# Patient Record
Sex: FEMALE | Race: BLACK OR AFRICAN AMERICAN | Employment: OTHER | URBAN - METROPOLITAN AREA
[De-identification: names, ages, dates, MRNs, and addresses within clinical notes are randomized per-mention and may not be internally consistent; named-entity substitution may affect disease eponyms.]

---

## 2018-04-25 ENCOUNTER — HOSPITAL ENCOUNTER (OUTPATIENT)
Dept: WOMENS IMAGING | Age: 73
Discharge: HOME | End: 2018-04-25

## 2018-04-25 DIAGNOSIS — M85.80 OTHER SPECIFIED DISORDERS OF BONE DENSITY AND STRUCTURE: ICD-10-CM

## 2018-04-25 DIAGNOSIS — M81.0 AGE-RELATED OSTEOPOROSIS WITHOUT CURRENT PATHOLOGICAL FRACTURE: ICD-10-CM

## 2019-05-10 ENCOUNTER — HOSPITAL ENCOUNTER (OUTPATIENT)
Dept: PHYSICAL THERAPY | Age: 74
Setting detail: THERAPIES SERIES
Discharge: HOME | End: 2019-05-10
Payer: MEDICARE

## 2019-05-10 PROCEDURE — 97162 PT EVAL MOD COMPLEX 30 MIN: CPT

## 2019-05-10 PROCEDURE — 97110 THERAPEUTIC EXERCISES: CPT

## 2019-05-10 ASSESSMENT — PAIN DESCRIPTION - ORIENTATION: ORIENTATION: LEFT;POSTERIOR

## 2019-05-10 ASSESSMENT — PAIN SCALES - GENERAL: PAINLEVEL_OUTOF10: 3

## 2019-05-10 ASSESSMENT — PAIN DESCRIPTION - PROGRESSION: CLINICAL_PROGRESSION: GRADUALLY IMPROVING

## 2019-05-10 ASSESSMENT — PAIN DESCRIPTION - DESCRIPTORS: DESCRIPTORS: ACHING;DULL

## 2019-05-10 ASSESSMENT — PAIN DESCRIPTION - FREQUENCY: FREQUENCY: CONTINUOUS

## 2019-05-10 ASSESSMENT — PAIN - FUNCTIONAL ASSESSMENT: PAIN_FUNCTIONAL_ASSESSMENT: ACTIVITIES ARE NOT PREVENTED

## 2019-05-10 ASSESSMENT — PAIN DESCRIPTION - ONSET: ONSET: GRADUAL

## 2019-05-10 ASSESSMENT — PAIN DESCRIPTION - LOCATION: LOCATION: BUTTOCKS

## 2019-05-10 ASSESSMENT — PAIN DESCRIPTION - PAIN TYPE: TYPE: CHRONIC PAIN

## 2019-05-10 NOTE — PROGRESS NOTES
Physical Therapy  Initial Assessment  Date: 5/10/2019  Patient Name: Seth Dent  MRN: [id number]  : 1945     Treatment Diagnosis: L LE weakness, glute pain     Restrictions       Subjective   General  Chart Reviewed: Yes  Patient assessed for rehabilitation services?: Yes  Referring Practitioner: Sara Sandoval CNP   Diagnosis: L Gluteus Strain   Subjective  Subjective: Intermittent for past few years with flair up this past March after fall onto hip in the yard with improvement with lidocane patches. No previous therapy. States having falls related tripping over things. No imaging in the hip. States pain with laying/sitting on L buttock. Return in a few weeks to referring provider. Pain Screening  Patient Currently in Pain: Yes  Pain Assessment  Pain Assessment: 0-10  Pain Level: 3(Worst: 5/10 )  Patient's Stated Pain Goal: No pain  Pain Type: Chronic pain  Pain Location: Buttocks(Left )  Pain Orientation: Left;Posterior  Pain Descriptors: Aching;Dull  Pain Frequency: Continuous  Pain Onset: Gradual  Clinical Progression: Gradually improving  Functional Pain Assessment: Activities are not prevented(Completes all activities at slower pace)  Non-Pharmaceutical Pain Intervention(s): Heat applied(patches)  Vital Signs  Patient Currently in Pain: Yes    Vision/Hearing  Vision  Vision: Impaired(donning glasses )  Hearing  Hearing: Within functional limits    Orientation  Orientation  Overall Orientation Status: Within Normal Limits    Social/Functional History  Social/Functional History  Type of Home: House  Home Layout: Two level  Home Access: Stairs to enter with rails  Entrance Stairs - Rails: Both  Bathroom Shower/Tub: Tub/Shower unit; Walk-in shower  ADL Assistance: Independent  Homemaking Assistance: Independent  Ambulation Assistance: Independent  Transfer Assistance: Independent  Active : Yes  Mode of Transportation: Car  Occupation: Part time employment; Retired  Type of occupation: Complexity  REQUIRES PT FOLLOW UP: Yes  Activity Tolerance  Activity Tolerance: Patient limited by fatigue         Plan   Plan  Times per week: 2  Plan weeks: 5  Specific instructions for Next Treatment: Review HEP, open/closed chain targeting L LE including glute focusing on knee ext, hip ER/ext, static/dynamic stability onto L LE, stretching of piriformis and modalities PRN  Current Treatment Recommendations: Strengthening, ROM, Modalities, Neuromuscular Re-education, Balance Training, Home Exercise Program, Manual Therapy - Soft Tissue Mobilization        Goals  Short term goals  Time Frame for Short term goals: Defer to [address] term goals  Time Frame for Long term goals : 5 weeks 6/17/19   Long term goal 1: Pt will demo I with HEP/symptom management. Long term goal 2: Pt will report >25% improvement per HOOS. Long term goal 3: Pt will demo >4/5 L LE to ease transfers. Long term goal 4: Pt will be able to sit and lay on L side of hip with >50% imrprovement in pain. Long term goal 5: Pt will demo sit to stand and bridge without compensation and good technique.    Patient Goals   Patient goals : Improve pain and strength        Tash Calvin, PT, DPT, OCS    5/10/2019 1:08 PM

## 2019-05-10 NOTE — PLAN OF CARE
Outpatient Physical Therapy           [place]           [] Phone: [phone]   Fax: [phone]  [place]           [] Phone: [phone]   Fax: [phone]     To: Referring Practitioner: Allyson Mckeon CNP     From: Getachew Guadarrama, PT     Patient: Kareem Arce         : 1945  Diagnosis: Diagnosis: L Gluteus Strain    Treatment Diagnosis: Treatment Diagnosis: L LE weakness, glute pain    Date: 5/10/2019    Physical Therapy Certification/Re-Certification Form  Dear Allyson Mckeon CNP  The following patient has been evaluated for physical therapy services and for therapy to continue, insurance requires physician review of the treatment plan initially and every 90 days. Please review the attached evaluation and/or summary of the patient's plan of care, and verify that you agree therapy should continue by signing the attached document and sending it back to our office. Assessment:      Pt is 72year old female with 2 month sudden onset of L hip pain after fall onto hip. Pt now has difficulties completing sitting on L hip an disuse contributing to symptoms. Pt will benefit with PT services with progression of strength/ROM, manual and modalities to return to PLOF. Pt prior to onset of current condition had no pain with able to complete full ADLs and work activities. Patient agrees with established plan of care and assisted in the development of their short term and long term goals. Patient had no adverse reaction with initial treatment and there are no barriers to learning. Demonstrates no mental or cognitive disorder.         Plan of Care/Treatment to date:  [x] Therapeutic Exercise  [x] Modalities:  [x] Therapeutic Activity     [] Ultrasound  [x] Electrical Stimulation  [x] Gait Training      [] Cervical Traction [] Lumbar Traction  [x] Neuromuscular Re-education    [x] Cold/hotpack [] Iontophoresis   [x] Instruction in HEP      [] Vasopneumatic     [x] Manual Therapy               [] Aquatic Therapy       Other:    ? Frequency/Duration:  # Days per week: [] 1 day # Weeks: [] 1 week [] 5 weeks     [x] 2 days? [] 2 weeks [x] 6 weeks     [] 3 days   [] 3 weeks [] 7 weeks     [] 4 days   [] 4 weeks [] 8 weeks         [] 9 weeks [] 10 weeks         [] 11 weeks [] 12 weeks    Rehab Potential/Progress: [] Excellent [x] Good [] Fair  [] Poor     Goals:      Short term goals  Time Frame for Short term goals: Defer to [address] term goals  Time Frame for Long term goals : 5 weeks 6/17/19   Long term goal 1: Pt will demo I with HEP/symptom management. Long term goal 2: Pt will report >25% improvement per HOOS. Long term goal 3: Pt will demo >4/5 L LE to ease transfers. Long term goal 4: Pt will be able to sit and lay on L side of hip with >50% imrprovement in pain. Long term goal 5: Pt will demo sit to stand and bridge without compensation and good technique. Electronically signed by:  Ricco Casey PT, DPT, OCS  5/10/2019, 3:01 PM      5/10/2019 3:01 PM       If you have any questions or concerns, please don't hesitate to call.   Thank you for your referral.      Physician Signature:________________________________Date:_________ TIME: _____  By signing above, therapists plan is approved by physician

## 2019-05-10 NOTE — FLOWSHEET NOTE
Outpatient Physical Therapy  [place]           [x] Phone: [phone]   Fax: [phone]  [place]           [] Phone: [phone]   Fax: [phone]        Physical Therapy Daily Treatment Note  Date:  5/10/2019    Patient Name:  John Chambers    :  1945  MRN: [id number]  Restrictions/Precautions:  --  Diagnosis:  L Gluteus strain     Date of Injury/Surgery: --  Treatment Diagnosis:  L LE weakness, glute pain   Insurance/Certification information:  Medicare, [employer]    Referring Physician:   Tal Morales CNP  Next Doctor Visit:  --  Plan of care signed (Y/N):  N, sent 5/10/19  Outcome Measure:   Visit# / total visits:  1 / 10 per POC  Pain level: 3/10   Goals:         Short term goals  Time Frame for Short term goals: Defer to [address] term goals  Time Frame for Long term goals : 5 weeks 19   Long term goal 1: Pt will demo I with HEP/symptom management. Long term goal 2: Pt will report >25% improvement per HOOS. Long term goal 3: Pt will demo >4/5 L LE to ease transfers. Long term goal 4: Pt will be able to sit and lay on L side of hip with >50% imrprovement in pain. Long term goal 5: Pt will demo sit to stand and bridge without compensation and good technique. Patient Goals   Patient goals : Improve pain and strength            Summary of Evaluation:   Pt is 72year old female with 2 month sudden onset of L hip pain after fall onto hip. Pt now has difficulties completing sitting on L hip an disuse contributing to symptoms. Pt will benefit with PT services with progression of strength/ROM, manual and modalities to return to PLOF. Pt prior to onset of current condition had no pain with able to complete full ADLs and work activities. Patient agrees with established plan of care and assisted in the development of their short term and long term goals. Patient had no adverse reaction with initial treatment and there are no barriers to learning.  Demonstrates no mental or cognitive disorder. Subjective:  See eval         Any changes in Ambulatory Summary Sheet? None        Objective:  See eval     Exercises:  Exercise/Equipment 5/10/19 Date Date           WARM UP       Nu step    5'           TABLE      SKTC  15\"x4     Hip isometric ADD 10x2  3\"     L hooklying clamshell RTB 10x2     Bridge                STANDING      Hip ext  10x2     Lateral stepping       Shuttle kickbacks      Shuttle squat                             PROPRIOCEPTION      B[facility] D[facility]                               MODALITIES                        Other Therapeutic Activities/Education: Patient received education on their current pathology and how their condition effects them with their functional activities. Patient understood discussion and questions were answered. Patient understands their activity limitations and understands rational for treatment progression. Home Exercise Program:  HO issued, reviewed and discussed with patient. Pt agreed to comply. Manual Treatments:--        Modalities:  --      Communication with other providers:  POC sent 5/10/19      Assessment:   Pt is 72year old female with 2 month sudden onset of L hip pain after fall onto hip. Pt now has difficulties completing sitting on L hip an disuse contributing to symptoms. Pt will benefit with PT services with progression of strength/ROM, manual and modalities to return to PLOF. Pt prior to onset of current condition had no pain with able to complete full ADLs and work activities. Patient agrees with established plan of care and assisted in the development of their short term and long term goals. Patient had no adverse reaction with initial treatment and there are no barriers to learning. Demonstrates no mental or cognitive disorder.       End session pain: /10      Plan for Next Session:  Review HEP, open/closed chain targeting L LE including glute focusing on knee ext, hip ER/ext, static/dynamic stability onto L LE, stretching of piriformis and modalities PRN      Time In / Time Out:   25/60'         Timed Code/Total Treatment Minutes:  25' TE, 1 PT eval            Next Progress Note due:  Eval 5/10/19  Visit 10 or 30 days       Plan of Care Interventions:  [x] Therapeutic Exercise  [x] Modalities:  [x] Therapeutic Activity     [] Ultrasound  [x] Estim  [x] Gait Training      [] Cervical Traction [] Lumbar Traction  [x] Neuromuscular Re-education    [x] Cold/hotpack [] Iontophoresis   [x] Instruction in HEP      [] Vasopneumatic   [] Dry Needling    [x] Manual Therapy               [] Aquatic Therapy              Electronically signed by:  Britany Uribe PT, DPT, OCS  5/10/2019, 6:51 AM      5/10/2019 6:52 AM

## 2019-05-18 ENCOUNTER — HOSPITAL ENCOUNTER (OUTPATIENT)
Dept: PHYSICAL THERAPY | Age: 74
Setting detail: THERAPIES SERIES
Discharge: HOME | End: 2019-05-18
Payer: MEDICARE

## 2019-05-18 PROCEDURE — 97112 NEUROMUSCULAR REEDUCATION: CPT

## 2019-05-18 PROCEDURE — 97110 THERAPEUTIC EXERCISES: CPT

## 2019-05-18 NOTE — FLOWSHEET NOTE
Outpatient Physical Therapy  [place]           [x] Phone: [phone]   Fax: [phone]  [place]           [] Phone: [phone]   Fax: [phone]        Physical Therapy Daily Treatment Note  Date:  2019    Patient Name:  John Chambers    :  1945  MRN: [id number]  Restrictions/Precautions:  --  Diagnosis:  L Gluteus strain     Date of Injury/Surgery: --  Treatment Diagnosis:  L LE weakness, glute pain   Insurance/Certification information:  Medicare, [employer]    Referring Physician:   Tal Morales CNP  Next Doctor Visit:  --  Plan of care signed (Y/N):  N, sent 5/10/19  Outcome Measure:   Visit# / total visits:  2 / 10 per POC  Pain level: /10   Goals:         Short term goals  Time Frame for Short term goals: Defer to [address] term goals  Time Frame for Long term goals : 5 weeks 19   Long term goal 1: Pt will demo I with HEP/symptom management. Long term goal 2: Pt will report >25% improvement per HOOS. Long term goal 3: Pt will demo >4/5 L LE to ease transfers. Long term goal 4: Pt will be able to sit and lay on L side of hip with >50% imrprovement in pain. Long term goal 5: Pt will demo sit to stand and bridge without compensation and good technique. Patient Goals   Patient goals : Improve pain and strength            Summary of Evaluation:   Pt is 72year old female with 2 month sudden onset of L hip pain after fall onto hip. Pt now has difficulties completing sitting on L hip an disuse contributing to symptoms. Pt will benefit with PT services with progression of strength/ROM, manual and modalities to return to PLOF. Pt prior to onset of current condition had no pain with able to complete full ADLs and work activities. Patient agrees with established plan of care and assisted in the development of their short term and long term goals. Patient had no adverse reaction with initial treatment and there are no barriers to learning.  Demonstrates no mental or cognitive disorder. Subjective: Pt reports she is definitely seeing some improvement already! Pt reports she is really trying to put weight through her left leg, but really has to concentrate to do so. Any changes in Ambulatory Summary Sheet? None        Objective:  See below    Exercises:  Exercise/Equipment 5/10/19 5/18/19 Date           WARM UP       Nu step    5' 5'          TABLE      SKTC  15\"x4     Hip isometric ADD 10x2  3\"     L hooklying clamshell RTB 10x2 RHB 10X R/L ea    Bridge       SAQ  15 X R/L    STANDING      Hip ext  10x2     Lateral stepping   RHB 25' R/L ea    Shuttle kickbacks      Shuttle squat  2C 1X15    Sit to stand  23\" 1 X 15    Hip flex/ER  10 X R/L 3\" hold               PROPRIOCEPTION      [address] = red hip band      Other Therapeutic Activities/Education: Patient received education on their current pathology and how their condition effects them with their functional activities. Patient understood discussion and questions were answered. Patient understands their activity limitations and understands rational for treatment progression. Home Exercise Program:  HO issued, reviewed and discussed with patient. Pt agreed to comply. Manual Treatments:--        Modalities:  --      Communication with other providers:  POC sent 5/10/19      Assessment:   Pt tolerates listed interventions very well today - fatigues quickly, but recovers quickly as well. Pt instructed with all listed interventions and is able to demo adequate to good technique throughout treatment. Pt does note minimal complaints of low back pain with HL clams w/ RHB ( patient had requested a pillow under her because the table was cold - obliged, but this made back hurt. Removed pillow and placed two flat towels under her and back pain goes away). Pt is a pleasure to work with in the clinic today.       End session pain: /10      Plan for Next Session:  Review HEP, open/closed chain targeting L LE including glute focusing on knee ext, hip ER/ext, static/dynamic stability onto L LE, stretching of piriformis and modalities PRN      Time In / Time Out:  1216/1300        Timed Code/Total Treatment Minutes:  TE X 30' X 2;  NR X 14' X 1      Next Progress Note due:  Nanette 5/10/19  Visit 10 or 30 days       Plan of Care Interventions:  [x] Therapeutic Exercise  [x] Modalities:  [x] Therapeutic Activity     [] Ultrasound  [x] Estim  [x] Gait Training      [] Cervical Traction [] Lumbar Traction  [x] Neuromuscular Re-education    [x] Cold/hotpack [] Iontophoresis   [x] Instruction in HEP      [] Vasopneumatic   [] Dry Needling    [x] Manual Therapy               [] Aquatic Therapy              Electronically signed by:  Ira Nye II, PTA [id number]  5/10/2019, 6:51 AM      5/10/2019 6:52 AM

## 2019-05-22 ENCOUNTER — HOSPITAL ENCOUNTER (OUTPATIENT)
Dept: PHYSICAL THERAPY | Age: 74
Setting detail: THERAPIES SERIES
Discharge: HOME | End: 2019-05-22
Payer: MEDICARE

## 2019-05-22 PROCEDURE — 97110 THERAPEUTIC EXERCISES: CPT

## 2019-05-22 NOTE — FLOWSHEET NOTE
L LE, stretching of piriformis and modalities PRN      Time In / Time Out:    8869/9928      Timed Code/Total Treatment Minutes: 43' 3 TE       Next Progress Note due:  Eval 5/10/19  Visit 10 or 30 days       Plan of Care Interventions:  [x] Therapeutic Exercise  [x] Modalities:  [x] Therapeutic Activity     [] Ultrasound  [x] Estim  [x] Gait Training      [] Cervical Traction [] Lumbar Traction  [x] Neuromuscular Re-education    [x] Cold/hotpack [] Iontophoresis   [x] Instruction in HEP      [] Vasopneumatic   [] Dry Needling    [x] Manual Therapy               [] Aquatic Therapy              Electronically signed by:  Marisol Marte    8:35 AM  5/22/2019

## 2019-05-24 ENCOUNTER — HOSPITAL ENCOUNTER (OUTPATIENT)
Dept: PHYSICAL THERAPY | Age: 74
Setting detail: THERAPIES SERIES
Discharge: HOME | End: 2019-05-24
Payer: MEDICARE

## 2019-05-24 PROCEDURE — 97530 THERAPEUTIC ACTIVITIES: CPT

## 2019-05-24 PROCEDURE — 97140 MANUAL THERAPY 1/> REGIONS: CPT

## 2019-05-24 PROCEDURE — 97110 THERAPEUTIC EXERCISES: CPT

## 2019-05-24 NOTE — FLOWSHEET NOTE
Outpatient Physical Therapy  [place]           [x] Phone: [phone]   Fax: [phone]  [facility]           [] Phone: [phone]   Fax: [phone]        Physical Therapy Daily Treatment Note  Date:  2019    Patient Name:  Nish Sanchez    :  1945  MRN: [id number]  Restrictions/Precautions:  --  Diagnosis:  L Gluteus strain     Date of Injury/Surgery: --  Treatment Diagnosis:  L LE weakness, glute pain   Insurance/Certification information:  Medicare, [employer]    Referring Physician:   Grazyna Parker CNP  Next Doctor Visit:  --  Plan of care signed (Y/N):  N, sent 5/10/19  Outcome Measure:   Visit# / total visits:  4 / 10 per POC  Pain level: 2/10       Goals:         Short term goals  Time Frame for Short term goals: Defer to  [address] term goals  Time Frame for Long term goals : 5 weeks 19   Long term goal 1: Pt will demo I with HEP/symptom management. Met - reports compliance   Long term goal 2: Pt will report >25% improvement per HOOS. Long term goal 3: Pt will demo >4/5 L LE to ease transfers. Long term goal 4: Pt will be able to sit and lay on L side of hip with >50% imrprovement in pain. Progressing   Long term goal 5: Pt will demo sit to stand and bridge without compensation and good technique. Patient Goals   Patient goals : Improve pain and strength            Summary of Evaluation:   Pt is 72year old female with 2 month sudden onset of L hip pain after fall onto hip. Pt now has difficulties completing sitting on L hip an disuse contributing to symptoms. Pt will benefit with PT services with progression of strength/ROM, manual and modalities to return to PLOF. Pt prior to onset of current condition had no pain with able to complete full ADLs and work activities. Patient agrees with established plan of care and assisted in the development of their short term and long term goals.  Patient had no adverse reaction with initial treatment and there are no barriers to learning. Demonstrates no mental or cognitive disorder. Subjective: Patient states that the hip is getting better. Able to lay on it for a bit, not too long but longer than before and no longer painful but uncomfortable. No pain currently, just some discomfort. Was sore after last session but didn't last long and wasn't unbearable. Having less pain overall with daily activities. Any changes in Ambulatory Summary Sheet? None        Objective: Take small steps during exercises due to increased pain with larger steps once putting weight on the LE with larger ZAIRA. Very tender to palpation at greater trochanter, just posterior to greater trochanter, piriformis, and SI region. Quite limited with ER for piriformis stretch due to tightness and pain. Limited hip flexion to only 90° due to pain and muscle guarding. Exercises:  Exercise/Equipment 5/18/19 5/22/19 5/24/19           WARM UP       Nu step    5' L5 5' L5 5'         TABLE      L hooklying clamshell RHB 10X R/L ea     Bridge       SAQ 15 X R/L           STANDING      Hip ext   15* ea    Hip abduction   15* ea    Lateral stepping  RHB 25' R/L ea RTB 25' ea  -   Shuttle kickbacks  Unable  -   Shuttle squat 2C 1X15 2C 15* 2C 15*   Sit to stand 23\" 1 X 15 23\" 2*10    Hip flex/ER 10 X R/L 3\" hold Tap to BOSU 10* ea    FM fwd/retro/lateral walking    7# 5* fwd  7# 2* ea retro/lat               PROPRIOCEPTION      [facility] [place]                                MODALITIES                    RHB = red hip band      Other Therapeutic Activities/Education: none       Home Exercise Program:  HO issued, reviewed and discussed with patient. Pt agreed to comply. Manual Treatments: gentle piriformis/SKTC/cross body stretching, gentle STM to glut/piriformis and LB region.       Modalities:  --      Communication with other providers:  POC sent 5/10/19      Assessment:  Attempted to progress exercises some in the clinic today to make them more functional and improve balance and hip strength. Patient did not tolerate this well, she had moderate increase in pain/discomfort after new exercises. She remains very tender in glut/SI region and lacks ability to take big steps in any direction when standing due to increased pain/discomfort.       End session pain:  5/10      Plan for Next Session:  Review HEP, open/closed chain targeting L LE including glute focusing on knee ext, hip ER/ext, static/dynamic stability onto L LE, stretching of piriformis and modalities PRN      Time In / Time Out:    1350/1430      Timed Code/Total Treatment Minutes:  36' 1 man 10' 1 TE 10' 1 TA 20'      Next Progress Note due:  Nanette 5/10/19  Visit 10 or 30 days       Plan of Care Interventions:  [x] Therapeutic Exercise  [x] Modalities:  [x] Therapeutic Activity     [] Ultrasound  [x] Estim  [x] Gait Training      [] Cervical Traction [] Lumbar Traction  [x] Neuromuscular Re-education    [x] Cold/hotpack [] Iontophoresis   [x] Instruction in HEP      [] Vasopneumatic   [] Dry Needling    [x] Manual Therapy               [] Aquatic Therapy              Electronically signed by:  Stacy Holliday    7:15 AM  5/24/2019

## 2019-05-28 ENCOUNTER — HOSPITAL ENCOUNTER (OUTPATIENT)
Dept: PHYSICAL THERAPY | Age: 74
Setting detail: THERAPIES SERIES
Discharge: HOME | End: 2019-05-28
Payer: MEDICARE

## 2019-05-28 PROCEDURE — 97110 THERAPEUTIC EXERCISES: CPT

## 2019-05-28 NOTE — FLOWSHEET NOTE
Outpatient Physical Therapy  [place]           [x] Phone: [phone]   Fax: [phone]  [facility]           [] Phone: [phone]   Fax: [phone]        Physical Therapy Daily Treatment Note  Date:  2019    Patient Name:  Florencio Reveles    :  1945  MRN: [id number]  Restrictions/Precautions:  --  Diagnosis:  L Gluteus strain     Date of Injury/Surgery: --  Treatment Diagnosis:  L LE weakness, glute pain   Insurance/Certification information:  Medicare, [employer]    Referring Physician:   Scarlett Aguilar CNP  Next Doctor Visit:  --  Plan of care signed (Y/N):  N, sent 5/10/19  Outcome Measure:   Visit# / total visits:  5 / 10 per POC  Pain level: 0/10       Goals:         Short term goals  Time Frame for Short term goals: Defer to [address] term goals  Time Frame for Long term goals : 5 weeks 19   Long term goal 1: Pt will demo I with HEP/symptom management. Met - reports compliance   Long term goal 2: Pt will report >25% improvement per HOOS. Long term goal 3: Pt will demo >4/5 L LE to ease transfers. Long term goal 4: Pt will be able to sit and lay on L side of hip with >50% imrprovement in pain. Progressing   Long term goal 5: Pt will demo sit to stand and bridge without compensation and good technique. Patient Goals   Patient goals : Improve pain and strength            Summary of Evaluation:   Pt is 72year old female with 2 month sudden onset of L hip pain after fall onto hip. Pt now has difficulties completing sitting on L hip an disuse contributing to symptoms. Pt will benefit with PT services with progression of strength/ROM, manual and modalities to return to PLOF. Pt prior to onset of current condition had no pain with able to complete full ADLs and work activities. Patient agrees with established plan of care and assisted in the development of their short term and long term goals.  Patient had no adverse reaction with initial treatment and there are no stress/strain on hip for reduced pain and improved QOL.      End session pain:  4/10      Plan for Next Session:  Review HEP, open/closed chain targeting L LE including glute focusing on knee ext, hip ER/ext, static/dynamic stability onto L LE, stretching of piriformis and modalities PRN      Time In / Time Out:    1415/1503      Timed Code/Total Treatment Minutes:  50' 3 TE       Next Progress Note due:  Eval 5/10/19  Visit 10 or 30 days       Plan of Care Interventions:  [x] Therapeutic Exercise  [x] Modalities:  [x] Therapeutic Activity     [] Ultrasound  [x] Estim  [x] Gait Training      [] Cervical Traction [] Lumbar Traction  [x] Neuromuscular Re-education    [x] Cold/hotpack [] Iontophoresis   [x] Instruction in HEP      [] Vasopneumatic   [] Dry Needling    [x] Manual Therapy               [] Aquatic Therapy              Electronically signed by:  Nathaly Eaton    8:20 AM  5/28/2019

## 2019-05-30 ENCOUNTER — HOSPITAL ENCOUNTER (OUTPATIENT)
Dept: PHYSICAL THERAPY | Age: 74
Setting detail: THERAPIES SERIES
Discharge: HOME | End: 2019-05-30
Payer: MEDICARE

## 2019-05-30 PROCEDURE — 97110 THERAPEUTIC EXERCISES: CPT

## 2019-05-30 NOTE — FLOWSHEET NOTE
Outpatient Physical Therapy  [place]           [x] Phone: [phone]   Fax: [phone]  [place]           [] Phone: [phone]   Fax: [phone]        Physical Therapy Daily Treatment Note  Date:  2019    Patient Name:  Isadora Cole    :  1945  MRN: [id number]  Restrictions/Precautions:  --  Diagnosis:  L Gluteus strain     Date of Injury/Surgery: --  Treatment Diagnosis:  L LE weakness, glute pain   Insurance/Certification information:  Medicare,     Referring Physician:   Iglesia Ayala CNP  Next Doctor Visit:  --  Plan of care signed (Y/N):  N, sent 5/10/19; resent   Outcome Measure:   Visit# / total visits:  6 / 10 per POC  Pain level: 3/10       Goals:         Short term goals  Time Frame for Short term goals: Defer to [address] term goals  Time Frame for Long term goals : 5 weeks 19   Long term goal 1: Pt will demo I with HEP/symptom management. Met - reports compliance   Long term goal 2: Pt will report >25% improvement per HOOS. Long term goal 3: Pt will demo >4/5 L LE to ease transfers. Long term goal 4: Pt will be able to sit and lay on L side of hip with >50% imrprovement in pain. Progressing   Long term goal 5: Pt will demo sit to stand and bridge without compensation and good technique. Patient Goals   Patient goals : Improve pain and strength            Summary of Evaluation:   Pt is 72year old female with 2 month sudden onset of L hip pain after fall onto hip. Pt now has difficulties completing sitting on L hip an disuse contributing to symptoms. Pt will benefit with PT services with progression of strength/ROM, manual and modalities to return to PLOF. Pt prior to onset of current condition had no pain with able to complete full ADLs and work activities. Patient agrees with established plan of care and assisted in the development of their short term and long term goals.  Patient had no adverse reaction with initial treatment and there are no barriers to learning. Demonstrates no mental or cognitive disorder. Subjective: Patient states that the hip is okay, not too bad right now. Slept on it last night and it woke her up, pain was a 7/10 when she woke up. Any changes in Ambulatory Summary Sheet? None        Objective:          Exercises:  Exercise/Equipment 5/24/19 5/28/19 5/30/19           WARM UP       Nu step    L5 5' L5 5' L5 5'         TABLE      L hooklying clamshell   GTB 2*10   Bridge    15*   SAQ   -         STANDING      Hip ext   15* ea    Hip abduction   15* ea    Lateral step up   4\" 10* ea 4\" 15*    Lateral stepping  -     Retro walking focusing on hip extension   2*15'    Shuttle squat 2C 15*  2C 2*10   Sit to stand   Chair + airex 2*10   Hip flex/ER  Tap to BOSU 10* ea    FM fwd/retro/lateral walking  7# 5* fwd  7# 2* ea retro/lat  -    Ant lunges to BOSU   10* ea 10* ea              PROPRIOCEPTION      BOSU Marches   2*30\"    Tandem balance   2*30\" ea     Wobble board   Next  2*30\" s/s               MODALITIES                    RHB = red hip band      Other Therapeutic Activities/Education: none       Home Exercise Program:  HO issued, reviewed and discussed with patient. Pt agreed to comply. Manual Treatments:       Modalities:  --      Communication with other providers:  POC sent 5/10/19      Assessment:   Elin Rich has made some progress since starting therapy with reports of decreased pain overall, and no longer needing to wear her lidocaine patch for pain relief. She does continue to be quite tender to palpation in gluteal/lateral hip region and lacks ER and flexion ROM in the hip due to pain and tightness. She is also lacking strength in hip extensors and abductors and is limited in her ability to perform sit-stand from a standard chair without UE support.     End session pain:  4/10      Plan for Next Session:  Review HEP, open/closed chain targeting L LE including glute focusing on knee ext, hip ER/ext, static/dynamic stability onto L LE, stretching of piriformis and modalities PRN      Time In / Time Out:    1500/1542      Timed Code/Total Treatment Minutes:   43' 3 TE       Next Progress Note due:  Nanette 5/10/19  Visit 10 or 30 days       Plan of Care Interventions:  [x] Therapeutic Exercise  [x] Modalities:  [x] Therapeutic Activity     [] Ultrasound  [x] Estim  [x] Gait Training      [] Cervical Traction [] Lumbar Traction  [x] Neuromuscular Re-education    [x] Cold/hotpack [] Iontophoresis   [x] Instruction in HEP      [] Vasopneumatic   [] Dry Needling    [x] Manual Therapy               [] Aquatic Therapy              Electronically signed by:  Benedict Claros    8:44 AM  5/30/2019

## 2019-06-04 ENCOUNTER — HOSPITAL ENCOUNTER (OUTPATIENT)
Dept: PHYSICAL THERAPY | Age: 74
Setting detail: THERAPIES SERIES
Discharge: HOME | End: 2019-06-04
Payer: MEDICARE

## 2019-06-04 PROCEDURE — 97140 MANUAL THERAPY 1/> REGIONS: CPT

## 2019-06-04 PROCEDURE — 97110 THERAPEUTIC EXERCISES: CPT

## 2019-06-04 PROCEDURE — 97112 NEUROMUSCULAR REEDUCATION: CPT

## 2019-06-04 NOTE — FLOWSHEET NOTE
there are no barriers to learning. Demonstrates no mental or cognitive disorder. Subjective: Fide Arora states that the hip is feeling good overall. Is sore after therapy sessions but in general feels like she is making improvements. She is walking better and faster and able to go up/down stairs more easily with less pain. Still pretty sore on the lateral hip. Any changes in Ambulatory Summary Sheet? None        Objective:      AAROM L hip flexion 100°    Decreased pain with piriformis stretch/hip ER. Exercises:  Exercise/Equipment 5/28/19 5/30/19 6/4/19           WARM UP       Nu step    L5 5' L5 5' L5 5'         TABLE      L hooklying clamshell  GTB 2*10    Bridge   15*          STANDING      Hip ext  15* ea     Hip abduction  15* ea     Lateral step up  4\" 10* ea 4\" 15*  4\" 15* ea   Lateral stepping    GTB 15* ea   Monster walk fwd/retro   GTB 15* ea   Retro walking focusing on hip extension  2*15'     Shuttle squat  2C 2*10    Sit to stand  Chair + airex 2*10    Hip flex/ER Tap to BOSU 10* ea     FM fwd/retro/lateral walking  -     Ant lunges to BOSU  10* ea 10* ea 10* ea              PROPRIOCEPTION      BOSU Marches  2*30\"  2*30\"   Tandem balance  2*30\" ea   30\" ea    Wobble board  Next  2*30\" s/s 2*30\"               MODALITIES                    RHB = red hip band      Other Therapeutic Activities/Education: none       Home Exercise Program:  HO issued, reviewed and discussed with patient. Pt agreed to comply. Manual Treatments: piriformis and KTC stretches, stick rolling to glut region 10' total       Modalities:  --      Communication with other providers:  POC sent 5/10/19      Assessment:  Naila tolerated today's session well. She has made some progress with hip ROM and decreased tenderness/pain with hip stretching/palpation.      End session pain:  4/10      Plan for Next Session:  Review HEP, open/closed chain targeting L LE including glute focusing on knee ext, hip ER/ext, static/dynamic stability onto L LE, stretching of piriformis and modalities PRN      Time In / Time Out:  1515/1600     Timed Code/Total Treatment Minutes:   39' 1 man 10' 1 NR 10' 1 TE 25'      Next Progress Note due:  Nanette 5/10/19  Visit 10 or 30 days       Plan of Care Interventions:  [x] Therapeutic Exercise  [x] Modalities:  [x] Therapeutic Activity     [] Ultrasound  [x] Estim  [x] Gait Training      [] Cervical Traction [] Lumbar Traction  [x] Neuromuscular Re-education    [x] Cold/hotpack [] Iontophoresis   [x] Instruction in HEP      [] Vasopneumatic   [] Dry Needling    [x] Manual Therapy               [] Aquatic Therapy              Electronically signed by:  Silvia Mcclain    8:18 AM  6/4/2019

## 2019-06-12 ENCOUNTER — HOSPITAL ENCOUNTER (OUTPATIENT)
Dept: PHYSICAL THERAPY | Age: 74
Setting detail: THERAPIES SERIES
Discharge: HOME | End: 2019-06-12
Payer: MEDICARE

## 2019-06-12 PROCEDURE — 97530 THERAPEUTIC ACTIVITIES: CPT

## 2019-06-12 PROCEDURE — 97110 THERAPEUTIC EXERCISES: CPT

## 2019-06-12 NOTE — FLOWSHEET NOTE
Outpatient Physical Therapy  [place]           [x] Phone: [phone]   Fax: [phone]  [facility]           [] Phone: [phone]   Fax: [phone]        Physical Therapy Daily Treatment Note  Date:  2019    Patient Name:  Bam Kelley    :  1945  MRN: [id number]  Restrictions/Precautions:  --  Diagnosis:  L Gluteus strain     Date of Injury/Surgery: --  Treatment Diagnosis:  L LE weakness, glute pain   Insurance/Certification information:  Medicare, [employer]    Referring Physician:   Dre Kruger CNP  Next Doctor Visit:  --  Plan of care signed (Y/N):  N, sent 5/10/19; resent   Outcome Measure:   Visit# / total visits:  8 / 10 per POC  Pain level: 0/10       Goals:         Short term goals  Time Frame for Short term goals: Defer to [address] term goals  Time Frame for Long term goals : 5 weeks 19   Long term goal 1: Pt will demo I with HEP/symptom management. Met - reports compliance   Long term goal 2: Pt will report >25% improvement per HOOS. Long term goal 3: Pt will demo >4/5 L LE to ease transfers. Mostly met   Long term goal 4: Pt will be able to sit and lay on L side of hip with >50% imrprovement in pain. Progressing ; Met   Long term goal 5: Pt will demo sit to stand and bridge without compensation and good technique. Met   Patient Goals   Patient goals : Improve pain and strength            Summary of Evaluation:   Pt is 72year old female with 2 month sudden onset of L hip pain after fall onto hip. Pt now has difficulties completing sitting on L hip an disuse contributing to symptoms. Pt will benefit with PT services with progression of strength/ROM, manual and modalities to return to OF. Pt prior to onset of current condition had no pain with able to complete full ADLs and work activities. Patient agrees with established plan of care and assisted in the development of their short term and long term goals.  Patient had no adverse reaction with initial treatment and there are no barriers to learning. Demonstrates no mental or cognitive disorder. Subjective: Patient reports that she currently has no pain in her hip, hasn't had any pain for several days. Noticing that she is walking a lot better too! She reports that she is now able to lay and sit on that side with minimal increase in pain. Is going up and down her stairs at home now and has even started getting out in the neighborhood and doing some walking. Reports at least 75% improvement in symptoms. Any changes in Ambulatory Summary Sheet? None        Objective:  More of a struggle to do SL shuttle LP on the L vs. The R. Improved ability to perform resisted walks on the FM, able to take larger steps retro and laterally due to improved pain, strength, and stability.     MMT  L hip flexion  L hip extension 3-/5  L hip abduction 4+/5  L hip adduction 4/5  L hip IR 4+/5  L hip ER 4/5  L knee extension 4+/5  L knee flexion 4/5    Sit-Stand   20\" 10* with good form    Bridge   15* with good form and no pain      Exercises:  Exercise/Equipment 5/30/19 6/4/19 6/12/19           WARM UP       Nu step    L5 5' L5 5' L5 5'         TABLE      L hooklying clamshell GTB 2*10  -   Bridge  15*  15*         STANDING      Hip ext       Hip abduction       Lateral step up  4\" 15*  4\" 15* ea -   Lateral stepping   GTB 15* ea -   Monster walk fwd/retro  GTB 15* ea -   Retro walking focusing on hip extension    -   Shuttle squat 2C 2*10  3C 2*10  SL 2C 15* R, 10* L   Sit to stand Chair + airex 2*10  10* 20.5\" mat table    Hip flex/ER   -   FM fwd/retro/lateral walking    FM 3# 4* ea lat  FM 7# 4* ea fwd/retro   Ant lunges to BOSU  10* ea 10* ea 10* ea              PROPRIOCEPTION      BOSU Marches   2*30\" 2*30\"    Tandem balance   30\" ea  -   Wobble board  2*30\" s/s 2*30\" 2*30\"                MODALITIES                    RHB = red hip band      Other Therapeutic Activities/Education: none Home Exercise Program:  HO issued, reviewed and discussed with patient. Pt agreed to comply. Manual Treatments: piriformis and KTC stretches, stick rolling to glut region 10' total       Modalities:  --      Communication with other providers:  POC sent 5/10/19      Assessment:   Chandrika Montez appears to be making progress towards her goals. She demonstrated the ability to progress her exercises this date with increased strength, range of movement, and stability noted during these new activities.      End session pain:  4/10      Plan for Next Session:  Review HEP, open/closed chain targeting L LE including glute focusing on knee ext, hip ER/ext, static/dynamic stability onto L LE, stretching of piriformis and modalities PRN      Time In / Time Out:   1347/1438    Timed Code/Total Treatment Minutes:  46' 2 TA 30' 1 TE 21'      Next Progress Note due:  Nanette 5/10/19  Visit 10 or 30 days       Plan of Care Interventions:  [x] Therapeutic Exercise  [x] Modalities:  [x] Therapeutic Activity     [] Ultrasound  [x] Estim  [x] Gait Training      [] Cervical Traction [] Lumbar Traction  [x] Neuromuscular Re-education    [x] Cold/hotpack [] Iontophoresis   [x] Instruction in HEP      [] Vasopneumatic   [] Dry Needling    [x] Manual Therapy               [] Aquatic Therapy              Electronically signed by:  Valerie Leung    8:08 AM  6/12/2019

## 2019-06-14 ENCOUNTER — HOSPITAL ENCOUNTER (OUTPATIENT)
Dept: PHYSICAL THERAPY | Age: 74
Setting detail: THERAPIES SERIES
Discharge: HOME | End: 2019-06-14
Payer: MEDICARE

## 2019-06-14 PROCEDURE — 97110 THERAPEUTIC EXERCISES: CPT

## 2019-06-14 PROCEDURE — 97530 THERAPEUTIC ACTIVITIES: CPT

## 2019-06-14 NOTE — FLOWSHEET NOTE
Outpatient Physical Therapy  [place]           [x] Phone: [phone]   Fax: [phone]  [place]           [] Phone: [phone]   Fax: [phone]        Physical Therapy Daily Treatment Note  Date:  2019    Patient Name:  Gómez Liao    :  1945  MRN: [id number]  Restrictions/Precautions:  --  Diagnosis:  L Gluteus strain     Date of Injury/Surgery: --  Treatment Diagnosis:  L LE weakness, glute pain   Insurance/Certification information:  Medicare, [employer]    Referring Physician:   Leon Escoto CNP  Next Doctor Visit:  --  Plan of care signed (Y/N):  N, sent 5/10/19; resent   Outcome Measure:   Visit# / total visits:  9 / 10 per POC  Pain level: 0/10       Goals:         Short term goals  Time Frame for Short term goals: Defer to [address] term goals  Time Frame for Long term goals : 5 weeks 19   Long term goal 1: Pt will demo I with HEP/symptom management. Met - reports compliance   Long term goal 2: Pt will report >25% improvement per HOOS. MET  Long term goal 3: Pt will demo >4/5 L LE to ease transfers. Mostly met   Long term goal 4: Pt will be able to sit and lay on L side of hip with >50% imrprovement in pain. Progressing ; Met   Long term goal 5: Pt will demo sit to stand and bridge without compensation and good technique. Met   Patient Goals   Patient goals : Improve pain and strength MET            Summary of Evaluation:   Pt is 72year old female with 2 month sudden onset of L hip pain after fall onto hip. Pt now has difficulties completing sitting on L hip an disuse contributing to symptoms. Pt will benefit with PT services with progression of strength/ROM, manual and modalities to return to OF. Pt prior to onset of current condition had no pain with able to complete full ADLs and work activities. Patient agrees with established plan of care and assisted in the development of their short term and long term goals.  Patient had no adverse reaction with initial treatment and there are no barriers to learning. Demonstrates no mental or cognitive disorder. Subjective: Patient reports that she currently has no pain in her hip for last past week. She reports that she is now able to lay and sit on that side with slight discomfort. Able to lay on L side for 10 min with previously unable. Is going up and down her stairs at home now and has even started getting out in the neighborhood and doing some walking. Reports at least 75-80% improvement in symptoms. Any changes in Ambulatory Summary Sheet?   None        Objective:      Demo normal gait on level surfaces and stairs     MMT  L hip flexion: 4-/5  L hip extension 3-/5  L hip abduction 4+/5  L hip adduction 4/5  L hip IR 4+/5  L hip ER 4/5  L knee extension 4+/5  L knee flexion 4/5    L SLS: 35 sec with no increase in pain     Sit-Stand   5x sit to stand: 30 sec with UE assistance     Bridge   15* with good form and no pain    HOOS: 3/20   15% disability       Exercises:  Exercise/Equipment 5/30/19 6/4/19 6/12/19 6/14/19            WARM UP        Nu step    L5 5' L5 5' L5 5' Lv5   5'           TABLE       L hooklying clamshell GTB 2*10  -    Bridge  15*  15*           STANDING       Hip ext        Hip abduction        Lateral step up  4\" 15*  4\" 15* ea -    Lateral stepping   GTB 15* ea -    Monster walk fwd/retro  GTB 15* ea -    Retro walking focusing on hip extension    -    Piriformis stretch     10\"x3   Shuttle squat 2C 2*10  3C 2*10  SL 2C 15* R, 10* L    Sit to stand Chair + airex 2*10  10* 20.5\" mat table     Hip flex/ER   -    FM fwd/retro/lateral walking    FM 3# 4* ea lat  FM 7# 4* ea fwd/retro    Hooklying clamshell    GTB 10x2   Ant lunges to BOSU  10* ea 10* ea 10* ea     Supine hip flexor stretch         20\"x2 B    PROPRIOCEPTION       BOSU Marches   2*30\" 2*30\"     Tandem balance   30\" ea  -    Wobble board  2*30\" s/s 2*30\" 2*30\"                   MODALITIES RHB = red hip band      Other Therapeutic Activities/Education: none       Home Exercise Program:  HO issued, reviewed and discussed with patient. Pt agreed to comply. Issued new HO. Pt agreed to comply. Manual Treatments:     Modalities:  --      Communication with other providers:  POC sent 5/10/19      Assessment:   Pt has completed 9 visits since start of therapy on 5/10/19. Pt has increased ease completing stairs, sitting and community activiites. Pt demo improvements that include pain, LE strength/tolerance, balance and ability to complete prolonged activities. Pt has met most goals and feels ready to continue independently with HEP. Pt will be placed on hold for 30 days and return if indicated.        End session pain: 0 /10       Plan for Next Session:   open/closed chain targeting L LE including glute focusing on knee ext, hip ER/ext, static/dynamic stability onto L LE, stretching of piriformis and modalities PRN      Time In / Time Out:   1359/ 1438    Timed Code/Total Treatment Minutes:  39/39'           2 TA 25' 1 TE 14'      Next Progress Note due:  Eval 5/10/19  Visit 10 or 30 days       Plan of Care Interventions:  [x] Therapeutic Exercise  [x] Modalities:  [x] Therapeutic Activity     [] Ultrasound  [x] Estim  [x] Gait Training      [] Cervical Traction [] Lumbar Traction  [x] Neuromuscular Re-education    [x] Cold/hotpack [] Iontophoresis   [x] Instruction in HEP      [] Vasopneumatic   [] Dry Needling    [x] Manual Therapy               [] Aquatic Therapy              Electronically signed by:  Jailyn Gabriel DPT, OCS    6/14/2019 2:02 PM

## 2019-12-19 ENCOUNTER — HOSPITAL ENCOUNTER (OUTPATIENT)
Dept: PHYSICAL THERAPY | Age: 74
Setting detail: THERAPIES SERIES
Discharge: HOME | End: 2019-12-19
Payer: MEDICARE

## 2019-12-19 PROCEDURE — 97162 PT EVAL MOD COMPLEX 30 MIN: CPT

## 2019-12-19 PROCEDURE — 97112 NEUROMUSCULAR REEDUCATION: CPT

## 2020-01-16 ENCOUNTER — HOSPITAL ENCOUNTER (OUTPATIENT)
Dept: PHYSICAL THERAPY | Age: 75
Setting detail: THERAPIES SERIES
Discharge: HOME | End: 2020-01-16
Payer: MEDICARE

## 2020-01-16 PROCEDURE — 97112 NEUROMUSCULAR REEDUCATION: CPT

## 2020-01-16 NOTE — FLOWSHEET NOTE
Outpatient Physical Therapy  [place]           [x] Phone: [phone]   Fax: [phone]  [facility]           [] Phone: [phone]   Fax: [phone]        Physical Therapy Daily Treatment Note  Date:  2020    Patient Name:  Pratik Deluca    :  1945  MRN: [id number]  Restrictions/Precautions:  none  Diagnosis:   Diagnosis: falls  Date of Injury/Surgery:   Treatment Diagnosis:   decreased balance, falls  Insurance/Certification information: PT Insurance Information: Medicare and    Referring Physician:  Referring Practitioner: Kiran Martin NP  Next Doctor Visit:    Plan of care signed (Y/N):    Outcome Measure:   Visit# / total visits:  1 /10  Pain level: 5/10   Goals:          Long term goals  Time Frame for Long term goals : 60 days  Long term goal 1: indep with home program  Long term goal 2: no falls  Long term goal 3:  decrease dizziness by 50 %  Long term goal 4: improve balance by 25%    Summary of Evaluation: Assessment: Pt presents with hx of several falls , decreased balance reactions, and decreased ability to raise eyes up. She started with balance and gaze stability exs. Subjective:   Pt states she is doing better    Any changes in Ambulatory Summary Sheet? None        Objective:  See eval           Exercises: (No more than 4 columns)   Exercise/Equipment Date  19 Date 2020  #2 Date             WARM UP                     TABLE      Gaze stability horiz/ vertical and diagonal 5x each, very difficult for her to lift her eyes upward.  vertical 7x horiz   7x vertical, improved but still difficult                               STANDING      Ankle sways 10x each direction  Eyes open  Eyes closed only with side to side 5x eyes open  10x  Eyes closed,  Did well     Slow marches hold 3 sec 1 finger on rail  15x  5x 1 finger  10x not holding on    Sit to stand 5x  5x                                 PROPRIOCEPTION                  Recommend eating 3

## 2020-02-13 ENCOUNTER — HOSPITAL ENCOUNTER (OUTPATIENT)
Dept: PHYSICAL THERAPY | Age: 75
Setting detail: THERAPIES SERIES
Discharge: HOME | End: 2020-02-13
Payer: MEDICARE

## 2020-02-13 PROCEDURE — 97112 NEUROMUSCULAR REEDUCATION: CPT

## 2020-02-13 NOTE — FLOWSHEET NOTE
Outpatient Physical Therapy  [place]           [x] Phone: [phone]   Fax: [phone]  [place]           [] Phone: [phone]   Fax: [phone]        Physical Therapy Daily Treatment Note  Date:  2020    Patient Name:  Brandt Miner    :  1945  MRN: [id number]  Restrictions/Precautions:  none  Diagnosis:   Diagnosis: falls  Date of Injury/Surgery:   Treatment Diagnosis:   decreased balance, falls  Insurance/Certification information: PT Insurance Information: Medicare and    Referring Physician:  Referring Practitioner: Pari Adams NP  Next Doctor Visit:    Plan of care signed (Y/N):    Outcome Measure:   Visit# / total visits:  1 /10  Pain level: 5/10   Goals:          Long term goals  Time Frame for Long term goals : 60 days  Long term goal 1: indep with home program  Long term goal 2: no falls  Long term goal 3:  decrease dizziness by 50 %  Long term goal 4: improve balance by 25%    Summary of Evaluation: Assessment: Pt presents with hx of several falls , decreased balance reactions, and decreased ability to raise eyes up. She started with balance and gaze stability exs. Subjective:   Pt states she is doing much better. exs help. Better balance,  No falls    Any changes in Ambulatory Summary Sheet? None        Objective:  See eval           Exercises: (No more than 4 columns)   Exercise/Equipment Date  19 Date 2020  #2 Date 20 #3           WARM UP                     TABLE      Gaze stability horiz/ vertical and diagonal 5x each, very difficult for her to lift her eyes upward.  vertical 7x horiz   7x vertical, improved but still difficult 10x horiz., vertical, diagonal          Saccades horiz/ vertical    10x each, difficulty with smoothly moving eyes in any direction                  STANDING      Ankle sways 10x each direction  Eyes open  Eyes closed only with side to side 5x eyes open  10x  Eyes closed,  Did well     Slow marches hold 3 sec 1 finger on rail  15x  5x 1 finger  10x not holding on 20x not holding on   Sit to stand 5x  5x 10x    Walking with feet 2 to 3 inches apart   6 min gait,  improved                          PROPRIOCEPTION                  Recommend eating 3 meals a day recommend Doing better   Using boost, protein bars,                 MODALITIES                      Other Therapeutic Activities/Education:        Home Exercise Program:  Written above      Manual Treatments:        Modalities:        Communication with other providers:        Assessment:  (Response towards treatment session) (Pain Rating) improved balance and gaze stability -- advanced gait,  Gaze has improved    Assessment: Pt presents with hx of several falls , decreased balance reactions, and decreased ability to raise eyes up. She started with balance and gaze stability exs.         Plan for Next Session:  advance exs      Time In / Time Out:   051 to 1010    Timed Code/Total Treatment Minutes:  35 minNR     Next Progress Note due:  dc      Plan of Care Interventions:  [x] Therapeutic Exercise  [] Modalities:  [] Therapeutic Activity     [] Ultrasound  [] Estim  [] Gait Training      [] Cervical Traction [] Lumbar Traction  [x] Neuromuscular Re-education    [] Cold/hotpack [] Iontophoresis   [x] Instruction in HEP      [] Vasopneumatic   [] Dry Needling    [] Manual Therapy               [] Aquatic Therapy              Electronically signed by:  Pollo Montgomery 2/13/2020, 11:03 AM

## 2020-03-05 ENCOUNTER — HOSPITAL ENCOUNTER (OUTPATIENT)
Dept: PHYSICAL THERAPY | Age: 75
Setting detail: THERAPIES SERIES
Discharge: HOME | End: 2020-03-05
Payer: MEDICARE

## 2020-03-05 PROCEDURE — 97112 NEUROMUSCULAR REEDUCATION: CPT

## 2020-03-05 NOTE — FLOWSHEET NOTE
Outpatient Physical Therapy  [place]           [x] Phone: [phone]   Fax: [phone]  [facility]           [] Phone: [phone]   Fax: [phone]        Physical Therapy Daily Treatment Note  Date:  3/5/2020    Patient Name:  Felipe Bravo    :  1945  MRN: [id number]  Restrictions/Precautions:  none  Diagnosis:   Diagnosis: falls  Date of Injury/Surgery:   Treatment Diagnosis:   decreased balance, falls  Insurance/Certification information: PT Insurance Information: Medicare and    Referring Physician:  Referring Practitioner: Levi Quintero NP  Next Doctor Visit:    Plan of care signed (Y/N):    Outcome Measure:   Visit# / total visits:  1 /10  Pain level: 5/10   Goals:          Long term goals  Time Frame for Long term goals : 60 days  Long term goal 1: indep with home program  MET  Long term goal 2: no falls  MET  Long term goal 3:  decrease dizziness by 50 %   MET  80% less. Only have had a few episodes,  Doing much better  Long term goal 4: improve balance by 25%  MET    Summary of Evaluation: Assessment: Pt presents with hx of several falls , decreased balance reactions, and decreased ability to raise eyes up. She started with balance and gaze stability exs. Subjective:   Pt states she is doing much better. exs help. Better balance,  No falls . Pt is exercising more-- at [facility]-- home exs TV,  Prayer also helps     Any changes in Ambulatory Summary Sheet? None        Objective:  See eval           Exercises: (No more than 4 columns)   Exercise/Equipment Date  19 Date 2020  #2 Date 20 #3 3/5/20  #4            WARM UP                        TABLE       Gaze stability horiz/ vertical and diagonal 5x each, very difficult for her to lift her eyes upward.  vertical 7x horiz   7x vertical, improved but still difficult 10x horiz., vertical, diagonal  10x each direction 90% on target          Saccades horiz/ vertical    10x each, difficulty with

## 2024-06-11 ENCOUNTER — APPOINTMENT (OUTPATIENT)
Dept: GENERAL RADIOLOGY | Age: 79
DRG: 309 | End: 2024-06-11
Payer: MEDICARE

## 2024-06-11 ENCOUNTER — HOSPITAL ENCOUNTER (INPATIENT)
Age: 79
LOS: 2 days | Discharge: HOME HEALTH | DRG: 309 | End: 2024-06-14
Attending: STUDENT IN AN ORGANIZED HEALTH CARE EDUCATION/TRAINING PROGRAM | Admitting: STUDENT IN AN ORGANIZED HEALTH CARE EDUCATION/TRAINING PROGRAM
Payer: MEDICARE

## 2024-06-11 DIAGNOSIS — I48.91 NEW ONSET ATRIAL FIBRILLATION (HCC): Primary | ICD-10-CM

## 2024-06-11 DIAGNOSIS — I48.0 PAROXYSMAL ATRIAL FIBRILLATION (HCC): ICD-10-CM

## 2024-06-11 LAB
ALBUMIN SERPL-MCNC: 4.3 GM/DL (ref 3.4–5)
ALP BLD-CCNC: 73 IU/L (ref 40–129)
ALT SERPL-CCNC: 33 U/L (ref 10–40)
ANION GAP SERPL CALCULATED.3IONS-SCNC: 10 MMOL/L (ref 7–16)
AST SERPL-CCNC: 32 IU/L (ref 15–37)
BASOPHILS ABSOLUTE: 0 K/CU MM
BASOPHILS RELATIVE PERCENT: 0.7 % (ref 0–1)
BILIRUB SERPL-MCNC: 0.3 MG/DL (ref 0–1)
BUN SERPL-MCNC: 15 MG/DL (ref 6–23)
CALCIUM SERPL-MCNC: 9.6 MG/DL (ref 8.3–10.6)
CHLORIDE BLD-SCNC: 98 MMOL/L (ref 99–110)
CO2: 28 MMOL/L (ref 21–32)
CREAT SERPL-MCNC: 0.8 MG/DL (ref 0.6–1.1)
DIFFERENTIAL TYPE: ABNORMAL
EOSINOPHILS ABSOLUTE: 0.1 K/CU MM
EOSINOPHILS RELATIVE PERCENT: 1.3 % (ref 0–3)
GFR, ESTIMATED: 76 ML/MIN/1.73M2
GLUCOSE SERPL-MCNC: 112 MG/DL (ref 70–99)
HCT VFR BLD CALC: 38.2 % (ref 37–47)
HEMOGLOBIN: 12.6 GM/DL (ref 12.5–16)
IMMATURE NEUTROPHIL %: 0.2 % (ref 0–0.43)
LIPASE: 30 IU/L (ref 13–60)
LYMPHOCYTES ABSOLUTE: 1.1 K/CU MM
LYMPHOCYTES RELATIVE PERCENT: 20.4 % (ref 24–44)
MCH RBC QN AUTO: 31.7 PG (ref 27–31)
MCHC RBC AUTO-ENTMCNC: 33 % (ref 32–36)
MCV RBC AUTO: 96 FL (ref 78–100)
MONOCYTES ABSOLUTE: 0.3 K/CU MM
MONOCYTES RELATIVE PERCENT: 6.1 % (ref 0–4)
NEUTROPHILS ABSOLUTE: 4 K/CU MM
NEUTROPHILS RELATIVE PERCENT: 71.3 % (ref 36–66)
NUCLEATED RBC %: 0 %
PDW BLD-RTO: 14.2 % (ref 11.7–14.9)
PLATELET # BLD: 238 K/CU MM (ref 140–440)
PMV BLD AUTO: 9.5 FL (ref 7.5–11.1)
POTASSIUM SERPL-SCNC: 3.6 MMOL/L (ref 3.5–5.1)
RBC # BLD: 3.98 M/CU MM (ref 4.2–5.4)
SODIUM BLD-SCNC: 136 MMOL/L (ref 135–145)
TOTAL IMMATURE NEUTOROPHIL: 0.01 K/CU MM
TOTAL NUCLEATED RBC: 0 K/CU MM
TOTAL PROTEIN: 7.8 GM/DL (ref 6.4–8.2)
TROPONIN, HIGH SENSITIVITY: 14 NG/L (ref 0–14)
WBC # BLD: 5.6 K/CU MM (ref 4–10.5)

## 2024-06-11 PROCEDURE — 6370000000 HC RX 637 (ALT 250 FOR IP): Performed by: STUDENT IN AN ORGANIZED HEALTH CARE EDUCATION/TRAINING PROGRAM

## 2024-06-11 PROCEDURE — 71045 X-RAY EXAM CHEST 1 VIEW: CPT

## 2024-06-11 PROCEDURE — G0378 HOSPITAL OBSERVATION PER HR: HCPCS

## 2024-06-11 PROCEDURE — 80053 COMPREHEN METABOLIC PANEL: CPT

## 2024-06-11 PROCEDURE — 83690 ASSAY OF LIPASE: CPT

## 2024-06-11 PROCEDURE — 96374 THER/PROPH/DIAG INJ IV PUSH: CPT

## 2024-06-11 PROCEDURE — 93005 ELECTROCARDIOGRAM TRACING: CPT | Performed by: STUDENT IN AN ORGANIZED HEALTH CARE EDUCATION/TRAINING PROGRAM

## 2024-06-11 PROCEDURE — 2580000003 HC RX 258: Performed by: STUDENT IN AN ORGANIZED HEALTH CARE EDUCATION/TRAINING PROGRAM

## 2024-06-11 PROCEDURE — 2500000003 HC RX 250 WO HCPCS: Performed by: STUDENT IN AN ORGANIZED HEALTH CARE EDUCATION/TRAINING PROGRAM

## 2024-06-11 PROCEDURE — 84484 ASSAY OF TROPONIN QUANT: CPT

## 2024-06-11 PROCEDURE — 99285 EMERGENCY DEPT VISIT HI MDM: CPT

## 2024-06-11 PROCEDURE — 85025 COMPLETE CBC W/AUTO DIFF WBC: CPT

## 2024-06-11 RX ORDER — FELODIPINE 5 MG/1
5 TABLET, EXTENDED RELEASE ORAL DAILY
Status: ON HOLD | COMMUNITY
End: 2024-06-13 | Stop reason: HOSPADM

## 2024-06-11 RX ORDER — SODIUM CHLORIDE 0.9 % (FLUSH) 0.9 %
5-40 SYRINGE (ML) INJECTION EVERY 12 HOURS SCHEDULED
Status: DISCONTINUED | OUTPATIENT
Start: 2024-06-11 | End: 2024-06-14 | Stop reason: HOSPADM

## 2024-06-11 RX ORDER — POLYETHYLENE GLYCOL 3350 17 G/17G
17 POWDER, FOR SOLUTION ORAL DAILY PRN
Status: DISCONTINUED | OUTPATIENT
Start: 2024-06-11 | End: 2024-06-14 | Stop reason: HOSPADM

## 2024-06-11 RX ORDER — TRIAMTERENE AND HYDROCHLOROTHIAZIDE 37.5; 25 MG/1; MG/1
1 TABLET ORAL DAILY
Status: DISCONTINUED | OUTPATIENT
Start: 2024-06-12 | End: 2024-06-14 | Stop reason: HOSPADM

## 2024-06-11 RX ORDER — LANOLIN ALCOHOL/MO/W.PET/CERES
3 CREAM (GRAM) TOPICAL NIGHTLY PRN
Status: DISCONTINUED | OUTPATIENT
Start: 2024-06-11 | End: 2024-06-14 | Stop reason: HOSPADM

## 2024-06-11 RX ORDER — MAGNESIUM SULFATE IN WATER 40 MG/ML
2000 INJECTION, SOLUTION INTRAVENOUS PRN
Status: DISCONTINUED | OUTPATIENT
Start: 2024-06-11 | End: 2024-06-14 | Stop reason: HOSPADM

## 2024-06-11 RX ORDER — TRIAMTERENE AND HYDROCHLOROTHIAZIDE 37.5; 25 MG/1; MG/1
1 TABLET ORAL DAILY
Status: ON HOLD | COMMUNITY
Start: 2023-07-10 | End: 2024-06-14 | Stop reason: HOSPADM

## 2024-06-11 RX ORDER — POTASSIUM CHLORIDE 20 MEQ/1
40 TABLET, EXTENDED RELEASE ORAL ONCE
Status: COMPLETED | OUTPATIENT
Start: 2024-06-11 | End: 2024-06-11

## 2024-06-11 RX ORDER — POTASSIUM CHLORIDE 7.45 MG/ML
10 INJECTION INTRAVENOUS PRN
Status: DISCONTINUED | OUTPATIENT
Start: 2024-06-11 | End: 2024-06-14 | Stop reason: HOSPADM

## 2024-06-11 RX ORDER — ONDANSETRON 2 MG/ML
4 INJECTION INTRAMUSCULAR; INTRAVENOUS EVERY 6 HOURS PRN
Status: DISCONTINUED | OUTPATIENT
Start: 2024-06-11 | End: 2024-06-14 | Stop reason: HOSPADM

## 2024-06-11 RX ORDER — LATANOPROST 50 UG/ML
1 SOLUTION/ DROPS OPHTHALMIC NIGHTLY
Status: DISCONTINUED | OUTPATIENT
Start: 2024-06-11 | End: 2024-06-14 | Stop reason: HOSPADM

## 2024-06-11 RX ORDER — ONDANSETRON 4 MG/1
4 TABLET, ORALLY DISINTEGRATING ORAL EVERY 8 HOURS PRN
Status: DISCONTINUED | OUTPATIENT
Start: 2024-06-11 | End: 2024-06-14 | Stop reason: HOSPADM

## 2024-06-11 RX ORDER — SODIUM CHLORIDE 9 MG/ML
INJECTION, SOLUTION INTRAVENOUS PRN
Status: DISCONTINUED | OUTPATIENT
Start: 2024-06-11 | End: 2024-06-14 | Stop reason: HOSPADM

## 2024-06-11 RX ORDER — ACETAMINOPHEN 325 MG/1
650 TABLET ORAL EVERY 6 HOURS PRN
Status: DISCONTINUED | OUTPATIENT
Start: 2024-06-11 | End: 2024-06-14 | Stop reason: HOSPADM

## 2024-06-11 RX ORDER — ACETAMINOPHEN 650 MG/1
650 SUPPOSITORY RECTAL EVERY 6 HOURS PRN
Status: DISCONTINUED | OUTPATIENT
Start: 2024-06-11 | End: 2024-06-14 | Stop reason: HOSPADM

## 2024-06-11 RX ORDER — BIMATOPROST 0.1 MG/ML
1 SOLUTION/ DROPS OPHTHALMIC NIGHTLY
COMMUNITY
Start: 2023-12-14 | End: 2024-12-12

## 2024-06-11 RX ORDER — DILTIAZEM HYDROCHLORIDE 5 MG/ML
10 INJECTION INTRAVENOUS ONCE
Status: COMPLETED | OUTPATIENT
Start: 2024-06-11 | End: 2024-06-11

## 2024-06-11 RX ORDER — SODIUM CHLORIDE 0.9 % (FLUSH) 0.9 %
5-40 SYRINGE (ML) INJECTION PRN
Status: DISCONTINUED | OUTPATIENT
Start: 2024-06-11 | End: 2024-06-14 | Stop reason: HOSPADM

## 2024-06-11 RX ORDER — 0.9 % SODIUM CHLORIDE 0.9 %
1000 INTRAVENOUS SOLUTION INTRAVENOUS ONCE
Status: COMPLETED | OUTPATIENT
Start: 2024-06-11 | End: 2024-06-11

## 2024-06-11 RX ORDER — AMLODIPINE BESYLATE 5 MG/1
5 TABLET ORAL DAILY
Status: DISCONTINUED | OUTPATIENT
Start: 2024-06-12 | End: 2024-06-12

## 2024-06-11 RX ADMIN — METOPROLOL TARTRATE 25 MG: 25 TABLET, FILM COATED ORAL at 22:40

## 2024-06-11 RX ADMIN — POTASSIUM CHLORIDE 40 MEQ: 1500 TABLET, EXTENDED RELEASE ORAL at 22:39

## 2024-06-11 RX ADMIN — LATANOPROST 1 DROP: 50 SOLUTION OPHTHALMIC at 22:39

## 2024-06-11 RX ADMIN — DILTIAZEM HYDROCHLORIDE 10 MG: 5 INJECTION, SOLUTION INTRAVENOUS at 19:14

## 2024-06-11 RX ADMIN — SODIUM CHLORIDE 1000 ML: 9 INJECTION, SOLUTION INTRAVENOUS at 19:14

## 2024-06-11 RX ADMIN — APIXABAN 5 MG: 5 TABLET, FILM COATED ORAL at 22:39

## 2024-06-11 ASSESSMENT — PAIN SCALES - GENERAL
PAINLEVEL_OUTOF10: 0
PAINLEVEL_OUTOF10: 0

## 2024-06-11 ASSESSMENT — PAIN - FUNCTIONAL ASSESSMENT: PAIN_FUNCTIONAL_ASSESSMENT: NONE - DENIES PAIN

## 2024-06-11 ASSESSMENT — LIFESTYLE VARIABLES
HOW MANY STANDARD DRINKS CONTAINING ALCOHOL DO YOU HAVE ON A TYPICAL DAY: PATIENT DOES NOT DRINK
HOW OFTEN DO YOU HAVE A DRINK CONTAINING ALCOHOL: NEVER

## 2024-06-11 NOTE — ED PROVIDER NOTES
EMERGENCY DEPARTMENT HISTORY AND PHYSICAL EXAM      Patient Name: Naila Salazar  MRN: [id number]  : 1945  Date of Evaluation: 2024  ED Provider:  Anant Kolb DO    History of Presenting Illness     Chief Complaint:   Chief Complaint   Patient presents with   • Fatigue   • Nausea   • Headache     Apple watch reported afib       HPI: Patient is a 78 y.o. female with past medical history of hypertension presenting to the emergency department with a chief complaint of nausea, weakness and lightheadedness with concerns of atrial fibrillation after her Apple Watch notified her of an abnormal heart rhythm.  Patient states yesterday she began to experience increased fatigue nausea and lightheadedness.  Patient states she feels weak in general and is reported a headache.  Patient states her watch notified her that she was in atrial fibrillation.  Patient denies any previous history of atrial fibrillation.  Patient denies any associated chest pain or shortness of breath.  Patient denies any falls trauma or loss of consciousness.  Patient denies fevers, chills, cough, nausea, vomiting, abdominal pain, diarrhea, dysuria.  Patient denies smoking history or history of COPD.          Past History     Past Medical History:   Past Medical History:   Diagnosis Date   • Glaucoma    • Hypertension      Surgical History: History reviewed. No pertinent surgical history.  Family History: History reviewed. No pertinent family history.  Social History:   Social History     Socioeconomic History   • Marital status:      Spouse name: Not on file   • Number of children: Not on file   • Years of education: Not on file   • Highest education level: Not on file   Occupational History   • Not on file   Tobacco Use   • Smoking status: Never   • Smokeless tobacco: Never   Substance and Sexual Activity   • Alcohol use: Not on file   • Drug use: Never   • Sexual activity: Not Currently   Other Topics Concern   • Not on file  undetermined  Abnormal ECG  No previous ECGs available           Radiologic Studies:   If obtained, pertinent radiology studies and findings discussed and MDM.    Medical Decision Making     Patient was triaged by nursing staff and I reviewed vital signs.  Available medical records were reviewed including nursing notes, past medical history, past surgical history, family history and social history.  History obtained by patient and/or family without any significant limitations.    MDM:     On initial exam patient resting comfortably no acute distress.  Patient is afebrile, tachycardic with a variable rate, and has stable blood pressures.  There is no increased work of breathing and lungs are clear bilaterally.  Patient's heart rate is ranging from 110-150.  Abdomen soft nontender.  Skin exam unremarkable.  No lower extremity pitting edema, erythema, or calf tenderness.    Initial EKG shows atrial fibrillation with a rate of 114, normal axis, good R wave progression, no ST segment elevations greater than 1 mm in contiguous leads, and intervals otherwise within normal limits.    Patient's initial blood pressures are relatively soft.  Patient ordered a liter of fluids and a 10 mg IV bolus of Cardizem.    Given new onset atrial fibrillation plan will be for hospitalization, echo and additional cardiac evaluation management.    On reevaluation after initial Cardizem bolus patient's heart rate has improved into the 80s.      Critical Care Time:  The services I provided to this patient were to treat and/or prevent clinically significant deterioration that could result in the failure of one or more body systems and/or organ systems.    Services included the following:  -reviewing nursing notes and old charts  -vital sign assessments  -direct patient care  -medication orders and management  -interpreting and reviewing diagnostic studies/labs  -re-evaluations  -documentation time    Aggregate critical care time was 35 minutes,

## 2024-06-11 NOTE — ED TRIAGE NOTES
Patient presents for c/o nausea, fatigue and headache. States her apple watch reports she is in afib. Her physician suggested she be evaluated.

## 2024-06-12 ENCOUNTER — APPOINTMENT (OUTPATIENT)
Dept: NON INVASIVE DIAGNOSTICS | Age: 79
DRG: 309 | End: 2024-06-12
Attending: STUDENT IN AN ORGANIZED HEALTH CARE EDUCATION/TRAINING PROGRAM
Payer: MEDICARE

## 2024-06-12 PROBLEM — I48.91 ATRIAL FIBRILLATION WITH RAPID VENTRICULAR RESPONSE (HCC): Status: ACTIVE | Noted: 2024-06-12

## 2024-06-12 LAB
ANION GAP SERPL CALCULATED.3IONS-SCNC: 8 MMOL/L (ref 7–16)
BASOPHILS ABSOLUTE: 0 K/CU MM
BASOPHILS RELATIVE PERCENT: 0.7 % (ref 0–1)
BUN SERPL-MCNC: 11 MG/DL (ref 6–23)
CALCIUM SERPL-MCNC: 8.9 MG/DL (ref 8.3–10.6)
CHLORIDE BLD-SCNC: 101 MMOL/L (ref 99–110)
CO2: 28 MMOL/L (ref 21–32)
CREAT SERPL-MCNC: 0.7 MG/DL (ref 0.6–1.1)
DIFFERENTIAL TYPE: ABNORMAL
ECHO AO ROOT DIAM: 2.8 CM
ECHO AO ROOT INDEX: 1.67 CM/M2
ECHO AV AREA PEAK VELOCITY: 2.4 CM2
ECHO AV AREA VTI: 2.3 CM2
ECHO AV AREA/BSA PEAK VELOCITY: 1.4 CM2/M2
ECHO AV AREA/BSA VTI: 1.4 CM2/M2
ECHO AV MEAN GRADIENT: 3 MMHG
ECHO AV MEAN VELOCITY: 0.8 M/S
ECHO AV PEAK GRADIENT: 5 MMHG
ECHO AV PEAK VELOCITY: 1.1 M/S
ECHO AV VELOCITY RATIO: 0.82
ECHO AV VTI: 14.8 CM
ECHO BSA: 1.67 M2
ECHO EST RA PRESSURE: 8 MMHG
ECHO IVC PROX: 1.9 CM
ECHO LA AREA 4C: 11.2 CM2
ECHO LA DIAMETER INDEX: 1.43 CM/M2
ECHO LA DIAMETER: 2.4 CM
ECHO LA MAJOR AXIS: 3.3 CM
ECHO LA TO AORTIC ROOT RATIO: 0.86
ECHO LA VOL MOD A4C: 31 ML (ref 22–52)
ECHO LA VOLUME INDEX MOD A4C: 18 ML/M2 (ref 16–34)
ECHO LV E' LATERAL VELOCITY: 14 CM/S
ECHO LV E' SEPTAL VELOCITY: 9 CM/S
ECHO LV EDV A4C: 57 ML
ECHO LV EDV INDEX A4C: 34 ML/M2
ECHO LV EJECTION FRACTION A4C: 56 %
ECHO LV ESV A4C: 25 ML
ECHO LV ESV INDEX A4C: 15 ML/M2
ECHO LV FRACTIONAL SHORTENING: 29 % (ref 28–44)
ECHO LV INTERNAL DIMENSION DIASTOLE INDEX: 2.08 CM/M2
ECHO LV INTERNAL DIMENSION DIASTOLIC: 3.5 CM (ref 3.9–5.3)
ECHO LV INTERNAL DIMENSION SYSTOLIC INDEX: 1.49 CM/M2
ECHO LV INTERNAL DIMENSION SYSTOLIC: 2.5 CM
ECHO LV IVSD: 0.9 CM (ref 0.6–0.9)
ECHO LV MASS 2D: 103.4 G (ref 67–162)
ECHO LV MASS INDEX 2D: 61.5 G/M2 (ref 43–95)
ECHO LV POSTERIOR WALL DIASTOLIC: 1.1 CM (ref 0.6–0.9)
ECHO LV RELATIVE WALL THICKNESS RATIO: 0.63
ECHO LVOT AREA: 2.8 CM2
ECHO LVOT AV VTI INDEX: 0.81
ECHO LVOT DIAM: 1.9 CM
ECHO LVOT MEAN GRADIENT: 2 MMHG
ECHO LVOT PEAK GRADIENT: 3 MMHG
ECHO LVOT PEAK VELOCITY: 0.9 M/S
ECHO LVOT STROKE VOLUME INDEX: 20.2 ML/M2
ECHO LVOT SV: 34 ML
ECHO LVOT VTI: 12 CM
ECHO MV A VELOCITY: 0.6 M/S
ECHO MV E DECELERATION TIME (DT): 70 MS
ECHO MV E VELOCITY: 0.83 M/S
ECHO MV E/A RATIO: 1.38
ECHO MV E/E' LATERAL: 5.93
ECHO MV E/E' RATIO (AVERAGED): 7.58
ECHO MV E/E' SEPTAL: 9.22
ECHO RIGHT VENTRICULAR SYSTOLIC PRESSURE (RVSP): 37 MMHG
ECHO RV MID DIMENSION: 3.5 CM
ECHO TV REGURGITANT MAX VELOCITY: 2.69 M/S
ECHO TV REGURGITANT PEAK GRADIENT: 29 MMHG
EOSINOPHILS ABSOLUTE: 0.1 K/CU MM
EOSINOPHILS RELATIVE PERCENT: 2 % (ref 0–3)
FOLATE SERPL-MCNC: 16.2 NG/ML (ref 3.1–17.5)
GFR, ESTIMATED: 90 ML/MIN/1.73M2
GLUCOSE SERPL-MCNC: 95 MG/DL (ref 70–99)
HCT VFR BLD CALC: 35.6 % (ref 37–47)
HEMOGLOBIN: 11.6 GM/DL (ref 12.5–16)
IMMATURE NEUTROPHIL %: 0.2 % (ref 0–0.43)
INR BLD: 1.1 INDEX
LYMPHOCYTES ABSOLUTE: 1.3 K/CU MM
LYMPHOCYTES RELATIVE PERCENT: 28.6 % (ref 24–44)
MAGNESIUM: 2 MG/DL (ref 1.8–2.4)
MCH RBC QN AUTO: 31.6 PG (ref 27–31)
MCHC RBC AUTO-ENTMCNC: 32.6 % (ref 32–36)
MCV RBC AUTO: 97 FL (ref 78–100)
MONOCYTES ABSOLUTE: 0.4 K/CU MM
MONOCYTES RELATIVE PERCENT: 8.1 % (ref 0–4)
NEUTROPHILS ABSOLUTE: 2.8 K/CU MM
NEUTROPHILS RELATIVE PERCENT: 60.4 % (ref 36–66)
NUCLEATED RBC %: 0 %
PDW BLD-RTO: 14.2 % (ref 11.7–14.9)
PLATELET # BLD: 215 K/CU MM (ref 140–440)
PMV BLD AUTO: 9.6 FL (ref 7.5–11.1)
POTASSIUM SERPL-SCNC: 3.9 MMOL/L (ref 3.5–5.1)
PRO-BNP: 2253 PG/ML
PROTHROMBIN TIME: 14.6 SECONDS (ref 11.7–14.5)
RBC # BLD: 3.67 M/CU MM (ref 4.2–5.4)
SODIUM BLD-SCNC: 137 MMOL/L (ref 135–145)
T4 FREE SERPL-MCNC: 1.13 NG/DL (ref 0.9–1.8)
TOTAL IMMATURE NEUTOROPHIL: 0.01 K/CU MM
TOTAL NUCLEATED RBC: 0 K/CU MM
TROPONIN, HIGH SENSITIVITY: 10 NG/L (ref 0–14)
TROPONIN, HIGH SENSITIVITY: 11 NG/L (ref 0–14)
TROPONIN, HIGH SENSITIVITY: 13 NG/L (ref 0–14)
TSH SERPL DL<=0.005 MIU/L-ACNC: 5.1 UIU/ML (ref 0.27–4.2)
VITAMIN B-12: 646.9 PG/ML (ref 211–911)
WBC # BLD: 4.6 K/CU MM (ref 4–10.5)

## 2024-06-12 PROCEDURE — 80048 BASIC METABOLIC PNL TOTAL CA: CPT

## 2024-06-12 PROCEDURE — 85610 PROTHROMBIN TIME: CPT

## 2024-06-12 PROCEDURE — 6360000002 HC RX W HCPCS

## 2024-06-12 PROCEDURE — 84443 ASSAY THYROID STIM HORMONE: CPT

## 2024-06-12 PROCEDURE — 83880 ASSAY OF NATRIURETIC PEPTIDE: CPT

## 2024-06-12 PROCEDURE — 2580000003 HC RX 258: Performed by: STUDENT IN AN ORGANIZED HEALTH CARE EDUCATION/TRAINING PROGRAM

## 2024-06-12 PROCEDURE — 94761 N-INVAS EAR/PLS OXIMETRY MLT: CPT

## 2024-06-12 PROCEDURE — 84439 ASSAY OF FREE THYROXINE: CPT

## 2024-06-12 PROCEDURE — 83735 ASSAY OF MAGNESIUM: CPT

## 2024-06-12 PROCEDURE — 36415 COLL VENOUS BLD VENIPUNCTURE: CPT

## 2024-06-12 PROCEDURE — 82746 ASSAY OF FOLIC ACID SERUM: CPT

## 2024-06-12 PROCEDURE — 2500000003 HC RX 250 WO HCPCS

## 2024-06-12 PROCEDURE — 82607 VITAMIN B-12: CPT

## 2024-06-12 PROCEDURE — 6370000000 HC RX 637 (ALT 250 FOR IP): Performed by: INTERNAL MEDICINE

## 2024-06-12 PROCEDURE — 99222 1ST HOSP IP/OBS MODERATE 55: CPT | Performed by: INTERNAL MEDICINE

## 2024-06-12 PROCEDURE — 84484 ASSAY OF TROPONIN QUANT: CPT

## 2024-06-12 PROCEDURE — APPNB30 APP NON BILLABLE TIME 0-30 MINS

## 2024-06-12 PROCEDURE — 6370000000 HC RX 637 (ALT 250 FOR IP)

## 2024-06-12 PROCEDURE — 6370000000 HC RX 637 (ALT 250 FOR IP): Performed by: STUDENT IN AN ORGANIZED HEALTH CARE EDUCATION/TRAINING PROGRAM

## 2024-06-12 PROCEDURE — 2140000000 HC CCU INTERMEDIATE R&B

## 2024-06-12 PROCEDURE — 93306 TTE W/DOPPLER COMPLETE: CPT | Performed by: INTERNAL MEDICINE

## 2024-06-12 PROCEDURE — 93306 TTE W/DOPPLER COMPLETE: CPT

## 2024-06-12 PROCEDURE — 2580000003 HC RX 258

## 2024-06-12 PROCEDURE — 93005 ELECTROCARDIOGRAM TRACING: CPT | Performed by: HOSPITALIST

## 2024-06-12 PROCEDURE — 85025 COMPLETE CBC W/AUTO DIFF WBC: CPT

## 2024-06-12 RX ORDER — 0.9 % SODIUM CHLORIDE 0.9 %
1000 INTRAVENOUS SOLUTION INTRAVENOUS ONCE
Status: COMPLETED | OUTPATIENT
Start: 2024-06-12 | End: 2024-06-12

## 2024-06-12 RX ORDER — DIGOXIN 0.25 MG/ML
125 INJECTION INTRAMUSCULAR; INTRAVENOUS EVERY 4 HOURS
Status: DISPENSED | OUTPATIENT
Start: 2024-06-12 | End: 2024-06-13

## 2024-06-12 RX ORDER — METOPROLOL TARTRATE 50 MG/1
50 TABLET, FILM COATED ORAL 2 TIMES DAILY
Status: DISCONTINUED | OUTPATIENT
Start: 2024-06-12 | End: 2024-06-14 | Stop reason: HOSPADM

## 2024-06-12 RX ORDER — DIGOXIN 0.25 MG/ML
500 INJECTION INTRAMUSCULAR; INTRAVENOUS ONCE
Status: COMPLETED | OUTPATIENT
Start: 2024-06-12 | End: 2024-06-12

## 2024-06-12 RX ORDER — MIDODRINE HYDROCHLORIDE 5 MG/1
10 TABLET ORAL ONCE
Status: COMPLETED | OUTPATIENT
Start: 2024-06-12 | End: 2024-06-12

## 2024-06-12 RX ADMIN — APIXABAN 5 MG: 5 TABLET, FILM COATED ORAL at 08:43

## 2024-06-12 RX ADMIN — DIGOXIN 125 MCG: 0.25 INJECTION INTRAMUSCULAR; INTRAVENOUS at 21:55

## 2024-06-12 RX ADMIN — TRIAMTERENE AND HYDROCHLOROTHIAZIDE 1 TABLET: 37.5; 25 TABLET ORAL at 11:01

## 2024-06-12 RX ADMIN — METOPROLOL TARTRATE 25 MG: 50 TABLET, FILM COATED ORAL at 21:33

## 2024-06-12 RX ADMIN — ACETAMINOPHEN 650 MG: 325 TABLET ORAL at 21:32

## 2024-06-12 RX ADMIN — SODIUM CHLORIDE, PRESERVATIVE FREE 10 ML: 5 INJECTION INTRAVENOUS at 21:34

## 2024-06-12 RX ADMIN — SODIUM CHLORIDE, PRESERVATIVE FREE 10 ML: 5 INJECTION INTRAVENOUS at 08:43

## 2024-06-12 RX ADMIN — APIXABAN 5 MG: 5 TABLET, FILM COATED ORAL at 21:32

## 2024-06-12 RX ADMIN — SODIUM CHLORIDE 5 MG/HR: 900 INJECTION, SOLUTION INTRAVENOUS at 15:01

## 2024-06-12 RX ADMIN — Medication 3 MG: at 21:32

## 2024-06-12 RX ADMIN — MIDODRINE HYDROCHLORIDE 10 MG: 5 TABLET ORAL at 16:01

## 2024-06-12 RX ADMIN — DIGOXIN 500 MCG: 0.25 INJECTION INTRAMUSCULAR; INTRAVENOUS at 16:01

## 2024-06-12 RX ADMIN — SODIUM CHLORIDE 1000 ML: 9 INJECTION, SOLUTION INTRAVENOUS at 15:51

## 2024-06-12 ASSESSMENT — ENCOUNTER SYMPTOMS
SHORTNESS OF BREATH: 0
CHEST TIGHTNESS: 1
VOMITING: 0
NAUSEA: 1
ABDOMINAL PAIN: 0

## 2024-06-12 ASSESSMENT — PAIN SCALES - WONG BAKER: WONGBAKER_NUMERICALRESPONSE: NO HURT

## 2024-06-12 ASSESSMENT — PAIN SCALES - GENERAL
PAINLEVEL_OUTOF10: 0
PAINLEVEL_OUTOF10: 0

## 2024-06-12 NOTE — ED NOTES
ED TO INPATIENT SBAR HANDOFF    Patient Name: Naila Salazar   :  1945  78 y.o.   Preferred Name  Naila  Family/Caregiver Present no   Restraints no   C-SSRS: Risk of Suicide: No Risk  Sitter no   Sepsis Risk Score Sepsis Risk Score: 1.83      Situation  Chief Complaint   Patient presents with    Fatigue    Nausea    Headache     Apple watch reported afib     Brief Description of Patient's Condition: Patient is a 78 y.o. female with past medical history of hypertension presenting to the emergency department with a chief complaint of nausea, weakness and lightheadedness with concerns of atrial fibrillation after her Apple Watch notified her of an abnormal heart rhythm.  Patient states yesterday she began to experience increased fatigue nausea and lightheadedness.  Patient states she feels weak in general and is reported a headache.  Patient states her watch notified her that she was in atrial fibrillation.  Patient denies any previous history of atrial fibrillation.  Patient denies any associated chest pain or shortness of breath.  Patient denies any falls trauma or loss of consciousness.  Patient denies fevers, chills, cough, nausea, vomiting, abdominal pain, diarrhea, dysuria.  Patient denies smoking history or history of COPD.   Mental Status: oriented, alert, coherent, logical, thought processes intact, and able to concentrate and follow conversation  Arrived from: home    Imaging:   XR CHEST PORTABLE   Final Result   No acute findings in the chest         Electronically signed by Kasey Hernandez        Abnormal labs:   Abnormal Labs Reviewed   CBC WITH AUTO DIFFERENTIAL - Abnormal; Notable for the following components:       Result Value    RBC 3.98 (*)     MCH 31.7 (*)     Neutrophils % 71.3 (*)     Lymphocytes % 20.4 (*)     Monocytes % 6.1 (*)     All other components within normal limits   COMPREHENSIVE METABOLIC PANEL - Abnormal; Notable for the following components:    Chloride 98 (*)     Glucose  112 (*)     All other components within normal limits        Background  History:   Past Medical History:   Diagnosis Date    Glaucoma     Hypertension        Assessment    Vitals:    Level of Consciousness: Alert (0)   Vitals:    06/11/24 1946 06/11/24 2015 06/11/24 2020 06/11/24 2041   BP:       Pulse: (!) 112 89 (!) 109 (!) 102   Resp: 20 16 13 18   Temp:       SpO2: 90% 96% 98% 99%   Weight:       Height:         PO Status: Regular  O2 Flow Rate:      Cardiac Rhythm: Afib HR low 100's  Last documented pain medication administered: n/a  NIH Score: NIH     Active LDA's:   Peripheral IV 06/11/24 Posterior;Right Forearm (Active)   Site Assessment Clean, dry & intact 06/11/24 1830   Line Status Brisk blood return;Specimen collected;Flushed 06/11/24 1830   Line Care Connections checked and tightened 06/11/24 1830   Phlebitis Assessment No symptoms 06/11/24 1830   Infiltration Assessment 0 06/11/24 1830   Dressing Status New dressing applied;Clean, dry & intact 06/11/24 1830   Dressing Type Transparent 06/11/24 1830       Pertinent or High Risk Medications/Drips: no   If Yes, please provide details:   Blood Product Administration: no  If Yes, please provide details:     Recommendation    Incomplete orders   Additional Comments:    If any further questions, please call Sending RN at [phone]    Electronically signed by: Electronically signed by Angelic Adam RN on 6/11/2024 at 9:01 PM

## 2024-06-12 NOTE — CARE COORDINATION
CM reviewed pt’s medical record and discussed pt in IDR. CM introduced self and explained the role of case management. CM in to see pt to initiate D/C planning. Pt is alert, oriented, and cooperative to assessment. Pt has a PCP, insurance, and ability to afford food and medications. Pt denies the need for HHC or SNF placement at discharge. Pt denies the need for any DME at this time. Pt denies having any other DC needs.      Plan for discharge is home alone. Please notify CM if any new DC needs arise.       06/12/24 1007   Service Assessment   Patient Orientation Alert and Oriented   Cognition Alert   History Provided By Patient;Medical Record   Primary Caregiver Self   Support Systems Children   Patient's Healthcare Decision Maker is: Legal Next of Kin   PCP Verified by CM Yes   Last Visit to PCP Within last 3 months   Prior Functional Level Independent in ADLs/IADLs   Current Functional Level Independent in ADLs/IADLs   Can patient return to prior living arrangement Yes   Ability to make needs known: Good   Family able to assist with home care needs: Yes   Would you like for me to discuss the discharge plan with any other family members/significant others, and if so, who? Yes  (daughter, Merry Salazar if needed)   Financial Resources Medicare   Community Resources None   Social/Functional History   Lives With Alone   Type of Home House   Home Layout Two level   Home Access Stairs to enter without rails   Entrance Stairs - Number of Steps 2   Bathroom Shower/Tub Walk-in shower   Bathroom Toilet Handicap height   Bathroom Equipment Built-in shower seat   Receives Help From Family;Friend(s)   ADL Assistance Independent   Homemaking Assistance Independent   Ambulation Assistance Independent   Transfer Assistance Independent   Active  Yes   Mode of Transportation Car   Discharge Planning   Patient expects to be discharged to: House

## 2024-06-12 NOTE — PROGRESS NOTES
Comprehensive Nutrition Assessment    Type and Reason for Visit:  Initial (low BMI)    Nutrition Recommendations/Plan:   Continue regular diet  Offer standard oral nutrition supplement daily  Monitor weights, po intakes, POC     Malnutrition Assessment:  Malnutrition Status:  At risk for malnutrition (Comment) (06/12/24 1137)    Context:  Acute Illness       Nutrition Assessment:    Admitted w/ new onset a-fib, PMHx of HTN and glaucoma. Decreased intake due to nausea over the past few days, nausea improving but remains mild. Does endorse occasionally intentionally \"going on a diet\" to lose 5 pounds, but usually stays around 130#. Pt does remark that she has noticed some muscle mass loss w/ aging, does have some mild losses, at risk for malnutrition. Discussed adequate intakes and protein sources. Willing to drink Ensure during stay, drinks at home. Follow at moderate nutrition risk.    Nutrition Related Findings:    Meds, labs reviewed; pt independent in ADLs, plan to return home Wound Type: None       Current Nutrition Intake & Therapies:    Average Meal Intake: Unable to assess  Average Supplements Intake: None Ordered  ADULT DIET; Regular    Anthropometric Measures:  Height: 167.6 cm (5' 6\")  Ideal Body Weight (IBW): 130 lbs (59 kg)    Admission Body Weight: 59.9 kg (132 lb 0.9 oz)  Current Body Weight: 59.9 kg (132 lb 0.9 oz),   IBW. Weight Source: Stated  Current BMI (kg/m2): 21.3  Usual Body Weight: 59.9 kg (132 lb) (per pt recall)  % Weight Change (Calculated): 0  Weight Adjustment For: No Adjustment                 BMI Categories: Underweight (BMI less than 22) age over 65    Estimated Daily Nutrient Needs:  Energy Requirements Based On: Kcal/kg  Weight Used for Energy Requirements: Current  Energy (kcal/day): 0460-7416  (25-30kcal/kg)  Weight Used for Protein Requirements: Current  Protein (g/day): 60-72 (1.0-1.2g/kg)  Method Used for Fluid Requirements: 1 ml/kcal  Fluid (ml/day): 1700    Nutrition

## 2024-06-12 NOTE — PROGRESS NOTES
4 Eyes Skin Assessment     NAME:  Naila Salazar  YOB: 1945  MEDICAL RECORD NUMBER:  [id number]    The patient is being assessed for  Admission    I agree that at least one RN has performed a thorough Head to Toe Skin Assessment on the patient. ALL assessment sites listed below have been assessed.      Areas assessed by both nurses:    Head, Face, Ears, Shoulders, Back, Chest, Arms, Elbows, Hands, Sacrum. Buttock, Coccyx, Ischium, and Legs. Feet and Heels        Does the Patient have a Wound? No noted wound(s)       Ilan Prevention initiated by RN: No  Wound Care Orders initiated by RN: No    Pressure Injury (Stage 3,4, Unstageable, DTI, NWPT, and Complex wounds) if present, place Wound referral order by RN under : No    New Ostomies, if present place, Ostomy referral order under : No     Nurse 1 eSignature: Electronically signed by Alexsandra River RN on 6/11/24 at 11:06 PM EDT    **SHARE this note so that the co-signing nurse can place an eSignature**    Nurse 2 eSignature: Electronically signed by Breanne Gonzalez RN on 6/12/24 at 2:31 AM EDT

## 2024-06-12 NOTE — PROGRESS NOTES
Afib RVR.    Patient appears dehydrated. Give NS bolus.    On cardizem drip at 5    Give midodrine, SBP soft in 90s.     Give IV digoxin 500 mcg x 1 followed by 125 mcg    Consider gerson cardioversion tomorrow. NPO after midnight    Elijah Perez PA-C 06/12/24 3:51 PM

## 2024-06-12 NOTE — PROGRESS NOTES
Pt currently Afib rvr with rate in 120-130s according to tele monitor and 12 lead EKG. Rate got as high as 164 prior to EKGbeing obtained. Pt asymptomatic. Dr. Mery Rivera made aware. LG

## 2024-06-12 NOTE — CONSULTS
medical condition listed as is.     Impression:  Principal Problem:    New onset atrial fibrillation (HCC)  Active Problems:    Atrial fibrillation with rapid ventricular response (HCC)  Resolved Problems:    * No resolved hospital problems. *    Assessment/plan    Atrial fibrillation with rapid ventricular response  Chest tightness  -Currently atrial fibrillation rapid ventricular response.  -Likely has been ongoing intermittently for years.  No EKG showing sinus rhythm  -[id number]: 4  -Obtain echo  -Plan for ALFREDITO cardioversion tomorrow.  N.p.o. after midnight  -On Cardizem drip, blood pressure soft.  -Give IV digoxin 500 mcg x 1 followed by 125 mcg x 2  Hypertension  -Blood pressure soft at this time.   -Hold Norvasc and Maxide.        Thank you  much for consult and giving us the opportunity in contributing in the care of this patient. Please feel free to call me for any questions.       Elijah Perez PA-C, 6/12/2024 4:51 PM     CARDIOLOGY ATTENDING ADDENDUM    MEDICAL DECISION MAKING:    Atrial fibrillation rapid ventricular rate  Chest tightness  Hypercoagulable state.    She likely has chest tightness from a atrial fibrillation with rapid ventricular rate.  Her heart rate has improved on diltiazem drip.  We have also given her digoxin in view of low blood pressure.  We will attempt ALFREDITO guided cardioversion tomorrow.  Please make her n.p.o. after midnight.  Her [id number] score is 4 please continue with apixaban 5 mg twice a day.    HPI:  I have reviewed the HPI  And agree with above   Naila is a 78 y.o.year old who and presents with had concerns including Fatigue, Nausea, and Headache (Apple watch reported afib).  Chief Complaint   Patient presents with    Fatigue    Nausea    Headache     Apple watch reported afib     Please review addendum/changes made to note above   Interval history: 78-year-old -American female who is now admitted with fatigue, nausea and headache.  She also noticed that

## 2024-06-12 NOTE — PROGRESS NOTES
Supplement    DVT Prophylaxis [] Lovenox, [] Heparin, [x] Eliquis, [] Xarelto  [] SCDs     Code Status Full Code       Disposition   Expected Disposition: Home  Estimated Date of Discharge: 2/14  Patient requires continued admission due to A-fib with RVR     Personally reviewed Lab Studies and Imaging       Subjective/Interval history:   Chief Complaint: Fatigue, Nausea, and Headache (Apple watch reported afib)     Was notified of afib her watch  Has been tired at home  She has been feeling some chest tightness today    Review of Systems:    Negative unless mentioned above    Objective:   No intake or output data in the 24 hours ending 06/12/24 1217     Vitals:   /72   Pulse 95   Temp 98.3 °F (36.8 °C) (Oral)   Resp 16   Ht 1.676 m (5' 6\")   Wt 59.9 kg (132 lb)   SpO2 99%   BMI 21.31 kg/m²     Physical Exam:   General: NAD  Eyes: no discharge  HENT: NCAT  Cardiovascular: irregular   Respiratory: Clear to auscultation   Gastrointestinal: Soft, nondistended, non tender,   Genitourinary: no suprapubic tenderness  Musculoskeletal: No edema, nontender  Skin: warm, dry, no gross lesions  Neuro: Alert. No gross deficits  Psych: Mood appropriate.     Medications:   Medications:   • metoprolol tartrate  50 mg Oral BID   • apixaban  5 mg Oral BID   • latanoprost  1 drop Both Eyes Nightly   • triamterene-hydroCHLOROthiazide  1 tablet Oral Daily   • sodium chloride flush  5-40 mL IntraVENous 2 times per day      Infusions:   • sodium chloride       PRN Meds: sodium chloride flush, 5-40 mL, PRN  sodium chloride, , PRN  potassium chloride, 10 mEq, PRN  magnesium sulfate, 2,000 mg, PRN  ondansetron, 4 mg, Q8H PRN   Or  ondansetron, 4 mg, Q6H PRN  melatonin, 3 mg, Nightly PRN  polyethylene glycol, 17 g, Daily PRN  acetaminophen, 650 mg, Q6H PRN   Or  acetaminophen, 650 mg, Q6H PRN        Labs      Recent Labs     06/11/24  1829 06/12/24  0031   WBC 5.6 4.6   HGB 12.6 11.6*   HCT 38.2 35.6*    215      Recent  Labs     06/11/24 1829 06/12/24 0031    137   K 3.6 3.9   CL 98* 101   CO2 28 28   BUN 15 11   CREATININE 0.8 0.7     Recent Labs     06/11/24 1829   AST 32   ALT 33   BILITOT 0.3   ALKPHOS 73     Recent Labs     06/12/24 0031   INR 1.1     No results for input(s): \"CKTOTAL\", \"CKMB\", \"CKMBINDEX\", \"TROPONINT\" in the last 72 hours.  No results found for: \"LABA1C\"  CALCIUM:  8.9/28 (06/12 0031)  Lab Results   Component Value Date/Time    MG 2.0 06/12/2024 12:31 AM           Electronically signed by Mery Rivera MD on 6/12/2024 at 12:17 PM

## 2024-06-12 NOTE — H&P
History and Physical      Name:  Naila Salazar /Age/Sex: 1945  (78 y.o. female)   MRN & CSN:  [id number] & [id number] Encounter Date/Time: 2024 8:05 PM   Location:  ED25/ED-25 PCP: Stubblefield, Tamico A, APRN - CNP       Hospital Day: 1    Assessment and Plan:     Patient is a 78-year-old female who presented with lightheadedness.     # New-onset atrial fibrillation  - Endorsed lightheadedness and nausea for x2 days. Had Apple watch notification of having atrial fibrillation with HR in 150's. Had similar symptoms few years ago. No syncope, chest pain, SOB, PND, orthopnea or LE edema. No previous bleeding or falls.   - Received IVP of Cardizem x1 in ED, HR improved to <110.   - [id number] 4 (age x2, female and HTN). Discussed risk vs benefit of anticoagulation, mutually agreed to initiate Eliquis with Lopressor for rate-control.   - Monitor overnight, consider Cardiology if has RVR. TSH and TTE ordered. Telemetry.     # Hypokalemia  - Mild. On HCTZ.   - Repleted, follow-up repeat labs, goal >4.0 with AF.     # Essential hypertension  - Continue Amlodipine (for Felodipine) and Triamterene-HCTZ.      Checklist:  Advanced care planning: full  Diet: cardiac  DVT ppx: Eliquis    Disposition: place in observation.  Estimated discharge: 1-2 day(s).  Current living situation: home.  Expected disposition: home.    Spoke with ED provider who recommended admission for the patient and I agree with that plan.  Personally reviewed lab studies and imaging.  EKG interpreted personally and results as stated above.  Imaging that was interpreted personally and results as stated above.    History of Present Illness:     Chief Complaint: lightheadedness    Patient is a 78-year-old female with a PMHx of HTN and glaucoma who presented to the ED with lightheadedness and nausea for x2 days. Had Apple watch notification of having atrial fibrillation with HR in 150's. Had similar symptoms few years ago. No syncope, chest

## 2024-06-13 ENCOUNTER — APPOINTMENT (OUTPATIENT)
Dept: NON INVASIVE DIAGNOSTICS | Age: 79
DRG: 309 | End: 2024-06-13
Payer: MEDICARE

## 2024-06-13 LAB
ECHO BSA: 1.82 M2
EKG ATRIAL RATE: 110 BPM
EKG DIAGNOSIS: NORMAL
EKG Q-T INTERVAL: 342 MS
EKG QRS DURATION: 102 MS
EKG QTC CALCULATION (BAZETT): 471 MS
EKG R AXIS: 42 DEGREES
EKG T AXIS: -8 DEGREES
EKG VENTRICULAR RATE: 114 BPM

## 2024-06-13 PROCEDURE — 2580000003 HC RX 258: Performed by: STUDENT IN AN ORGANIZED HEALTH CARE EDUCATION/TRAINING PROGRAM

## 2024-06-13 PROCEDURE — 99152 MOD SED SAME PHYS/QHP 5/>YRS: CPT | Performed by: INTERNAL MEDICINE

## 2024-06-13 PROCEDURE — 6370000000 HC RX 637 (ALT 250 FOR IP): Performed by: STUDENT IN AN ORGANIZED HEALTH CARE EDUCATION/TRAINING PROGRAM

## 2024-06-13 PROCEDURE — 2140000000 HC CCU INTERMEDIATE R&B

## 2024-06-13 PROCEDURE — 6370000000 HC RX 637 (ALT 250 FOR IP)

## 2024-06-13 PROCEDURE — 7100000011 HC PHASE II RECOVERY - ADDTL 15 MIN: Performed by: INTERNAL MEDICINE

## 2024-06-13 PROCEDURE — 93313 ECHO TRANSESOPHAGEAL: CPT

## 2024-06-13 PROCEDURE — 94761 N-INVAS EAR/PLS OXIMETRY MLT: CPT

## 2024-06-13 PROCEDURE — 99232 SBSQ HOSP IP/OBS MODERATE 35: CPT | Performed by: INTERNAL MEDICINE

## 2024-06-13 PROCEDURE — 6370000000 HC RX 637 (ALT 250 FOR IP): Performed by: INTERNAL MEDICINE

## 2024-06-13 PROCEDURE — 7100000010 HC PHASE II RECOVERY - FIRST 15 MIN: Performed by: INTERNAL MEDICINE

## 2024-06-13 PROCEDURE — 6360000002 HC RX W HCPCS: Performed by: INTERNAL MEDICINE

## 2024-06-13 PROCEDURE — 93010 ELECTROCARDIOGRAM REPORT: CPT | Performed by: INTERNAL MEDICINE

## 2024-06-13 PROCEDURE — 92610 EVALUATE SWALLOWING FUNCTION: CPT

## 2024-06-13 RX ORDER — METOPROLOL TARTRATE 50 MG/1
50 TABLET, FILM COATED ORAL 2 TIMES DAILY
Qty: 60 TABLET | Refills: 0 | Status: SHIPPED | OUTPATIENT
Start: 2024-06-13 | End: 2024-06-14

## 2024-06-13 RX ORDER — LIDOCAINE HYDROCHLORIDE 20 MG/ML
SOLUTION OROPHARYNGEAL PRN
Status: COMPLETED | OUTPATIENT
Start: 2024-06-13 | End: 2024-06-13

## 2024-06-13 RX ORDER — MIDAZOLAM HYDROCHLORIDE 1 MG/ML
INJECTION INTRAMUSCULAR; INTRAVENOUS PRN
Status: COMPLETED | OUTPATIENT
Start: 2024-06-13 | End: 2024-06-13

## 2024-06-13 RX ORDER — FENTANYL CITRATE 50 UG/ML
INJECTION, SOLUTION INTRAMUSCULAR; INTRAVENOUS PRN
Status: COMPLETED | OUTPATIENT
Start: 2024-06-13 | End: 2024-06-13

## 2024-06-13 RX ADMIN — FENTANYL CITRATE 25 MCG: 50 INJECTION, SOLUTION INTRAMUSCULAR; INTRAVENOUS at 08:56

## 2024-06-13 RX ADMIN — APIXABAN 5 MG: 5 TABLET, FILM COATED ORAL at 20:49

## 2024-06-13 RX ADMIN — METOPROLOL TARTRATE 50 MG: 50 TABLET, FILM COATED ORAL at 10:22

## 2024-06-13 RX ADMIN — MIDAZOLAM 2 MG: 1 INJECTION INTRAMUSCULAR; INTRAVENOUS at 08:56

## 2024-06-13 RX ADMIN — METOPROLOL TARTRATE 50 MG: 50 TABLET, FILM COATED ORAL at 20:49

## 2024-06-13 RX ADMIN — SODIUM CHLORIDE, PRESERVATIVE FREE 10 ML: 5 INJECTION INTRAVENOUS at 10:23

## 2024-06-13 RX ADMIN — TRIAMTERENE AND HYDROCHLOROTHIAZIDE 1 TABLET: 37.5; 25 TABLET ORAL at 10:22

## 2024-06-13 RX ADMIN — APIXABAN 5 MG: 5 TABLET, FILM COATED ORAL at 10:22

## 2024-06-13 RX ADMIN — LATANOPROST 1 DROP: 50 SOLUTION OPHTHALMIC at 21:51

## 2024-06-13 RX ADMIN — LIDOCAINE HYDROCHLORIDE 15 ML: 20 SOLUTION ORAL at 08:51

## 2024-06-13 RX ADMIN — SODIUM CHLORIDE, PRESERVATIVE FREE 10 ML: 5 INJECTION INTRAVENOUS at 20:49

## 2024-06-13 ASSESSMENT — PAIN SCALES - GENERAL
PAINLEVEL_OUTOF10: 0

## 2024-06-13 NOTE — DISCHARGE SUMMARY
V2.0  Discharge Summary    Name:  Naila Salazar /Age/Sex: 1945 (78 y.o. female)   Admit Date: 2024  Discharge Date: 24    MRN & CSN:  [id number] & [id number] Encounter Date and Time 24 4:07 PM EDT    Attending:  Mery Rivera MD Discharging Provider: Mery Rivera MD       Hospital Course:     HPI:   Patient is a 78-year-old female with a PMHx of HTN and glaucoma who presented to the ED with lightheadedness and nausea for x2 days. Had Apple watch notification of having atrial fibrillation with HR in 150's. Had similar symptoms few years ago. No syncope, chest pain, SOB, PND, orthopnea or LE edema. No previous bleeding or falls. Also endorsed nausea but no emesis. Denied any fevers, chills, cough, abdominal pain, C/D or urinary changes. Denied any tobacco or alcohol use.     Problem Based Course:   New onset atrial fibrillation with RVR  Patient endorsed lightheadedness and nausea for 2 days.  Her Apple Watch had notified her that she was having A-fib with a heart rate in the 150s.  She had similar symptoms a few years ago.  She had no syncope or chest pain or shortness of breath or lower extremity edema.  She had no previous history of bleeding or falls.  Patient was on beta-blocker.  During her stay her heart rate increased and she required a Cardizem drip.  Eventually she was weaned off of Cardizem drip.  Metoprolol was increased.  Patient was also had a chads Vas score of 4.  She was started on Eliquis.  Echo showed an EF of 55-60%; moderate to severe tricuspid regurgitation.  Cardiology attempted to perform a ALFREDITO cardioversion, however they were not able to advance the probe into the patient's esophagus.  TSH was 5.1 however T4 was 1.13.    Chest tightness  Likely due to A-fib.  Show troponins were negative.    Hypokalemia  Patient had low potassium which was replaced.  May have been due to hydrochlorothiazide.    Essential hypertension  Patient was on triamterene hydrochlorothiazide.

## 2024-06-13 NOTE — CARE COORDINATION
D/C plan remains the same, home alone, denies any d/c needs.  Notify CM if any d/c needs arise.  TE

## 2024-06-13 NOTE — DISCHARGE INSTRUCTIONS
Medications: see computerized discharge medication list  Activity: activity as tolerated  Diet: regular diet   Disposition: home  Discharged Condition: Stable     -Hold your metoprolol dose if your systolic blood pressure (top number) is less than 95 or if your heart rate is less than 55.    -if you feel that you are getting lightheaded or dizzy, please check your blood pressure. If the systolic blood pressure (top number) is less than 90 then please hold your blood pressure medications and notify a physician.       -please do not do any activities that put you at risk for falling. If you have any bleeding that does not stop or you hit your head while on the blood thinner, then please go to the Emergency Room to be evaluated.

## 2024-06-13 NOTE — PLAN OF CARE
Problem: Discharge Planning  Goal: Discharge to home or other facility with appropriate resources  6/13/2024 0908 by Everardo Bowen RN  Outcome: Progressing  6/13/2024 0509 by Allie Bhakta RN  Outcome: Progressing     Problem: Pain  Goal: Verbalizes/displays adequate comfort level or baseline comfort level  6/13/2024 0908 by Everardo Bowen RN  Outcome: Progressing  6/13/2024 0509 by Allie Bhakta RN  Outcome: Progressing     Problem: Nutrition Deficit:  Goal: Optimize nutritional status  6/13/2024 0908 by Everardo Bowen RN  Outcome: Progressing  6/13/2024 0509 by Allie Bhakta RN  Outcome: Progressing     Problem: Safety - Adult  Goal: Free from fall injury  6/13/2024 0908 by Everardo Bowen RN  Outcome: Progressing  6/13/2024 0509 by Allie Bhakta RN  Outcome: Progressing

## 2024-06-13 NOTE — PROGRESS NOTES
Off the cardizem gtt since 2230.   HR: 60-70- afib  Current BP:  97/60 (73) with her SBP sustaining between 90-97.  Her goal HR: less then 120 and SBP: greater then 90.   First dose of metoprolol given with education on purpose and side effects. Print of handed to patient. Patient ordered 50mg but d/t to SBP's sustaining towards low end of goal, only 25mg given. Last scheduled dose of digoxin also held. MD notified and aware.

## 2024-06-13 NOTE — PLAN OF CARE
Problem: Discharge Planning  Goal: Discharge to home or other facility with appropriate resources  Outcome: Progressing     Problem: Pain  Goal: Verbalizes/displays adequate comfort level or baseline comfort level  Outcome: Progressing     Problem: Nutrition Deficit:  Goal: Optimize nutritional status  Outcome: Progressing     Problem: Safety - Adult  Goal: Free from fall injury  Outcome: Progressing

## 2024-06-13 NOTE — PROGRESS NOTES
Speech-Language Pathology Dept.  Facility/Department: [address]   CLINICAL BEDSIDE SWALLOW EVALUATION    NAME: Naila Salazar  : 1945  MRN: [id number]    ADMISSION DATE: 2024  ADMITTING DIAGNOSIS: has New onset atrial fibrillation (HCC) and Atrial fibrillation with rapid ventricular response (HCC) on their problem list.  ONSET DATE: this admission    Impressions and Recommendations: Naila Salazar was seen for a bedside swallow evaluation following admission to [facility] with lightheadedness, fatigue, nausea, and afib detected by Apple watch. Per radiologist, chest xray on 24 revealed lungs are clear with \"no infiltrates or effusions.\" Medical hx includes hypertension and hypokalemia. Pt was seated EOB with lunch tray when SLP entered her room. She was agreeable to SLP visit. Pt expresses occasional globus sensation with foods feeling stuck in her upper esophagus when eating solids of various consistencies. She states taking small bites and reducing the speed at which she eats is beneficial. She reported a \"sore throat\" due to attempted scope during ALFREDITO cardioversion on this date. Pt's son and RN Everardo were present during evaluation.     Pt's oral mechanism examination was WFL and no asymmetry was noted. Vocal quality was WFL. Pt consumed trials of thin liquid via straw, puree, and regular solid on this date. No overt s/sx of penetration/aspiration noted. Pt alternated bites of regular solid with puree due to discomfort from attempted scope during ALFREDITO cardioversion.Naila Salazar presents with evidence of a functional oropharyngeal swallow characterized by functional oral acceptance, bolus preparation/mastication, timely a/p transit, and suspected timely initiation of the pharyngeal swallow.     Recommend patient consume a regular diet and thin liquids at this time provided use of swallow strategies as needed, including small sips/bites, reduced rate of oral intake, and alternating solid consistencies with

## 2024-06-13 NOTE — PROGRESS NOTES
Bedside shift report received from KAYLEIGH Mcgee.    Safety checks performed.       HR 70-80's - afib with assistance from Cardizem gtt.   Hemodynamically stable.  VSS- see flow sheet.      PIV patent with Cardizem running at ordered rate- See MAR for details.     Fall precautions maintained including bed in lowest position, alarm in place and call light in reach. 4 P's addressed, all needs met and resting comfortably in bed.      Safety and skin integrity maintained.     Daughter at bedside and both were updated on plan of care and goals for the evening. All questions and concerns answered.

## 2024-06-13 NOTE — PROGRESS NOTES
V2.0+  [facility] Hospitalist Progress Note      Name:  Naila Salazar /Age/Sex: 1945  (78 y.o. female)   MRN & CSN:  [id number] & [id number] Encounter Date/Time: 2024 12:17 PM EDT    Location:  [facility]/3111-A PCP: Stubblefield, Tamico A, APRN - CNP       Hospital Day: 3    Assessment and Plan:   Naila Salazar is a 78 y.o. female who presents with lightheadedness    # New-onset atrial fibrillation with RVR  - Endorsed lightheadedness and nausea for x2 days. Had Apple watch notification of having atrial fibrillation with HR in 150's. Had similar symptoms few years ago. No syncope, chest pain, SOB, PND, orthopnea or LE edema. No previous bleeding or falls.   - Received IVP of Cardizem x1 in ED, HR improved to <110.   - [id number] 4 (age x2, female and HTN). Discussed risk vs benefit of anticoagulation, mutually agreed to initiate Eliquis with Lopressor for rate-control.    -Check echo.  TSH 5.1, however T4: 1.13.  - ALFREDITO cardioversion could not be completed due to being unable to pass probe into esophagus.  Off Cardizem drip.  Increased metoprolol.  Cardiology following.  Received digoxin yesterday.    Chest tightness  - May be due to A-fib.  - EKG: A-fib with RVR.  Serial troponins negative.     # Hypokalemia  - Mild. On HCTZ.   - Repleted, follow-up repeat labs, goal >4.0 with AF.     # Essential hypertension  - Hold amlodipine to avoid hypotension while treating A-fib.  Continue triamterene hydrochlorothiazide.  - Received midodrine yesterday.  Hold Maxide due to low blood pressure.    Comment: Please note this report has been produced using speech recognition software and may contain errors related to that system including errors in grammar, punctuation, and spelling, as well as words and phrases that may be inappropriate. If there are any questions or concerns please feel free to contact the dictating provider for clarification.      Diet Diet NPO Exceptions are: Ice Chips, Sips of Water with Meds    DVT  Labs     06/11/24 1829 06/12/24 0031   WBC 5.6 4.6   HGB 12.6 11.6*   HCT 38.2 35.6*    215      Recent Labs     06/11/24 1829 06/12/24 0031    137   K 3.6 3.9   CL 98* 101   CO2 28 28   BUN 15 11   CREATININE 0.8 0.7     Recent Labs     06/11/24 1829   AST 32   ALT 33   BILITOT 0.3   ALKPHOS 73     Recent Labs     06/12/24 0031   INR 1.1     No results for input(s): \"CKTOTAL\", \"CKMB\", \"CKMBINDEX\", \"TROPONINT\" in the last 72 hours.  No results found for: \"LABA1C\"  CALCIUM:  8.9/28 (06/12 0031)  Lab Results   Component Value Date/Time    MG 2.0 06/12/2024 12:31 AM           Electronically signed by Mery Rivera MD on 6/13/2024 at 10:39 AM

## 2024-06-14 VITALS
OXYGEN SATURATION: 94 % | WEIGHT: 156 LBS | RESPIRATION RATE: 16 BRPM | DIASTOLIC BLOOD PRESSURE: 64 MMHG | HEART RATE: 90 BPM | HEIGHT: 66 IN | TEMPERATURE: 98 F | BODY MASS INDEX: 25.07 KG/M2 | SYSTOLIC BLOOD PRESSURE: 92 MMHG

## 2024-06-14 LAB
EKG ATRIAL RATE: 141 BPM
EKG DIAGNOSIS: NORMAL
EKG Q-T INTERVAL: 372 MS
EKG QRS DURATION: 110 MS
EKG QTC CALCULATION (BAZETT): 530 MS
EKG R AXIS: 51 DEGREES
EKG T AXIS: -32 DEGREES
EKG VENTRICULAR RATE: 122 BPM

## 2024-06-14 PROCEDURE — 6370000000 HC RX 637 (ALT 250 FOR IP): Performed by: HOSPITALIST

## 2024-06-14 PROCEDURE — 2580000003 HC RX 258: Performed by: STUDENT IN AN ORGANIZED HEALTH CARE EDUCATION/TRAINING PROGRAM

## 2024-06-14 PROCEDURE — 6370000000 HC RX 637 (ALT 250 FOR IP): Performed by: STUDENT IN AN ORGANIZED HEALTH CARE EDUCATION/TRAINING PROGRAM

## 2024-06-14 PROCEDURE — 94761 N-INVAS EAR/PLS OXIMETRY MLT: CPT

## 2024-06-14 PROCEDURE — 6360000002 HC RX W HCPCS: Performed by: STUDENT IN AN ORGANIZED HEALTH CARE EDUCATION/TRAINING PROGRAM

## 2024-06-14 PROCEDURE — 93010 ELECTROCARDIOGRAM REPORT: CPT | Performed by: INTERNAL MEDICINE

## 2024-06-14 RX ORDER — MIDODRINE HYDROCHLORIDE 5 MG/1
5 TABLET ORAL 2 TIMES DAILY WITH MEALS
Qty: 60 TABLET | Refills: 0 | Status: SHIPPED | OUTPATIENT
Start: 2024-06-14

## 2024-06-14 RX ORDER — MIDODRINE HYDROCHLORIDE 5 MG/1
5 TABLET ORAL 2 TIMES DAILY WITH MEALS
Status: DISCONTINUED | OUTPATIENT
Start: 2024-06-14 | End: 2024-06-14 | Stop reason: HOSPADM

## 2024-06-14 RX ORDER — MIDODRINE HYDROCHLORIDE 5 MG/1
5 TABLET ORAL
Status: DISCONTINUED | OUTPATIENT
Start: 2024-06-14 | End: 2024-06-14

## 2024-06-14 RX ORDER — METOPROLOL TARTRATE 50 MG/1
50 TABLET, FILM COATED ORAL 2 TIMES DAILY
Qty: 60 TABLET | Refills: 0 | Status: SHIPPED | OUTPATIENT
Start: 2024-06-14

## 2024-06-14 RX ORDER — MIDODRINE HYDROCHLORIDE 5 MG/1
5 TABLET ORAL 2 TIMES DAILY WITH MEALS
Qty: 60 TABLET | Refills: 0 | Status: CANCELLED | OUTPATIENT
Start: 2024-06-14

## 2024-06-14 RX ADMIN — ONDANSETRON 4 MG: 2 INJECTION INTRAMUSCULAR; INTRAVENOUS at 08:55

## 2024-06-14 RX ADMIN — APIXABAN 5 MG: 5 TABLET, FILM COATED ORAL at 08:55

## 2024-06-14 RX ADMIN — MIDODRINE HYDROCHLORIDE 5 MG: 5 TABLET ORAL at 10:33

## 2024-06-14 RX ADMIN — SODIUM CHLORIDE, PRESERVATIVE FREE 10 ML: 5 INJECTION INTRAVENOUS at 08:55

## 2024-06-14 NOTE — PROGRESS NOTES
Cardiology Progress Note     Admit Date:  6/11/2024      Subjective and  Overnight Events : ALFREDITO was unsuccessful today because of very narrow upper esophagus and in acute angle.        Physical Exam:  Vitals:    06/13/24 1532   BP: (!) 100/58   Pulse: 84   Resp: 18   Temp: 98.3 °F (36.8 °C)   SpO2: 96%        General Appearance:  No distress, conversant  Respiratory:  Normal breath sounds, No respiratory distress, No wheezing  Cardiovascular: S1, S2, no murmurs, gallops. JVD wnl  Abdomen /GI:  Bowel sounds normal, Soft, No tenderness   Integument:  Well hydrated, no rash   Neurologic:  Alert & oriented x 3, no focal deficits noted       Lab Data:  CBC:   Recent Labs     06/11/24 1829 06/12/24  0031   WBC 5.6 4.6   HGB 12.6 11.6*   HCT 38.2 35.6*   MCV 96.0 97.0    215     BMP:   Recent Labs     06/11/24  1829 06/12/24  0031    137   K 3.6 3.9   CL 98* 101   CO2 28 28   BUN 15 11   CREATININE 0.8 0.7     PT/INR:   Recent Labs     06/12/24  0031   PROTIME 14.6*   INR 1.1     BNP:    Recent Labs     06/12/24  0031   PROBNP 2,253*     TROPONIN: No results for input(s): \"TROPONINT\" in the last 72 hours.       Assessment and Recommendations:     Atrial fibrillation with rapid ventricular rate      She is clinically stable right now.  Her heart rate is controlled.  I would continue with current regimen and discharge her home on systemic anticoagulation.  Will bring her back in 4 weeks for cardioversion.    All labs, medications and tests reviewed by myself , continue all other medications of all above medical condition listed as is except for changes mentioned above.  Thank you very much for consult , please call with questions.    Gisell Ty MD, MD 6/13/2024 8:19 PM

## 2024-06-14 NOTE — DISCHARGE SUMMARY
V2.0  Discharge Summary    Name:  Naila Salazar /Age/Sex: 1945 (78 y.o. female)   Admit Date: 2024  Discharge Date: 24    MRN & CSN:  [id number] & [id number] Encounter Date and Time 24 11:27 AM EDT    Attending:  Mery Rivera MD Discharging Provider: Mery Rivera MD       Hospital Course:     HPI:   Patient is a 78-year-old female with a PMHx of HTN and glaucoma who presented to the ED with lightheadedness and nausea for x2 days. Had Apple watch notification of having atrial fibrillation with HR in 150's. Had similar symptoms few years ago. No syncope, chest pain, SOB, PND, orthopnea or LE edema. No previous bleeding or falls. Also endorsed nausea but no emesis. Denied any fevers, chills, cough, abdominal pain, C/D or urinary changes. Denied any tobacco or alcohol use.     Problem Based Course:   New onset atrial fibrillation with RVR  Patient endorsed lightheadedness and nausea for 2 days.  Her Apple Watch had notified her that she was having A-fib with a heart rate in the 150s.  She had similar symptoms a few years ago.  She had no syncope or chest pain or shortness of breath or lower extremity edema.  She had no previous history of bleeding or falls.  Patient was on beta-blocker.  During her stay her heart rate increased and she required a Cardizem drip.  Eventually she was weaned off of Cardizem drip.  Metoprolol was increased.  Patient was also had a chads Vas score of 4.  She was started on Eliquis.  Echo showed an EF of 55-60%; moderate to severe tricuspid regurgitation.  Cardiology attempted to perform a ALFREDITO cardioversion, however they were not able to advance the probe into the patient's esophagus.  TSH was 5.1 however T4 was 1.13.      Chest tightness  Likely due to A-fib.  Show troponins were negative.     Hypokalemia  Patient had low potassium which was replaced.  May have been due to hydrochlorothiazide.     Essential hypertension  Triamterene hydrochlorothiazide and

## 2024-06-14 NOTE — PROGRESS NOTES
V2.0+  [facility] Hospitalist Progress Note      Name:  Naila Salazar /Age/Sex: 1945  (78 y.o. female)   MRN & CSN:  [id number] & [id number] Encounter Date/Time: 2024 12:17 PM EDT    Location:  [facility]/3111-A PCP: Stubblefield, Tamico A, APRN - CNP       Hospital Day: 4    Assessment and Plan:   Naila Salazar is a 78 y.o. female who presents with lightheadedness    # New-onset atrial fibrillation with RVR  - Endorsed lightheadedness and nausea for x2 days. Had Apple watch notification of having atrial fibrillation with HR in 150's. Had similar symptoms few years ago. No syncope, chest pain, SOB, PND, orthopnea or LE edema. No previous bleeding or falls.   - Received IVP of Cardizem x1 in ED, HR improved to <110.   - [id number] 4 (age x2, female and HTN). Discussed risk vs benefit of anticoagulation, mutually agreed to initiate Eliquis with Lopressor for rate-control.    -Check echo.  TSH 5.1, however T4: 1.13.  - ALFREDITO cardioversion could not be completed due to being unable to pass probe into esophagus.  Off Cardizem drip.  Increased metoprolol.  Cardiology following.  Received digoxin yesterday.    Chest tightness  - May be due to A-fib.  - EKG: A-fib with RVR.  Serial troponins negative.     # Hypokalemia  - Mild. On HCTZ.   - Repleted, follow-up repeat labs, goal >4.0 with AF.     # Essential hypertension  - Hold amlodipine to avoid hypotension while treating A-fib.  Continue triamterene hydrochlorothiazide.  - Received midodrine yesterday.  Hold Maxide due to low blood pressure.    Comment: Please note this report has been produced using speech recognition software and may contain errors related to that system including errors in grammar, punctuation, and spelling, as well as words and phrases that may be inappropriate. If there are any questions or concerns please feel free to contact the dictating provider for clarification.      Diet ADULT ORAL NUTRITION SUPPLEMENT; Breakfast; Standard High

## 2024-06-14 NOTE — PROGRESS NOTES
D/c instructions gone over with pt in detail. All questions/concerns addressed at this time. Pt going to finish lunch and will call when ready to be taken out to car.    No

## 2024-06-21 ENCOUNTER — HOSPITAL ENCOUNTER (EMERGENCY)
Age: 79
Discharge: HOME | End: 2024-06-21
Attending: EMERGENCY MEDICINE
Payer: MEDICARE

## 2024-06-21 ENCOUNTER — APPOINTMENT (OUTPATIENT)
Dept: GENERAL RADIOLOGY | Age: 79
End: 2024-06-21
Payer: MEDICARE

## 2024-06-21 VITALS
BODY MASS INDEX: 25.07 KG/M2 | WEIGHT: 156 LBS | RESPIRATION RATE: 16 BRPM | HEART RATE: 71 BPM | SYSTOLIC BLOOD PRESSURE: 116 MMHG | OXYGEN SATURATION: 100 % | DIASTOLIC BLOOD PRESSURE: 77 MMHG | TEMPERATURE: 97.5 F | HEIGHT: 66 IN

## 2024-06-21 DIAGNOSIS — R89.9 ABNORMAL LABORATORY TEST: ICD-10-CM

## 2024-06-21 DIAGNOSIS — R07.89 CHEST WALL PAIN: Primary | ICD-10-CM

## 2024-06-21 LAB
ALBUMIN SERPL-MCNC: 3.8 GM/DL (ref 3.4–5)
ALP BLD-CCNC: 85 IU/L (ref 40–128)
ALT SERPL-CCNC: 198 U/L (ref 10–40)
ANION GAP SERPL CALCULATED.3IONS-SCNC: 11 MMOL/L (ref 7–16)
AST SERPL-CCNC: 147 IU/L (ref 15–37)
BASOPHILS ABSOLUTE: 0 K/CU MM
BASOPHILS RELATIVE PERCENT: 0.8 % (ref 0–1)
BILIRUB SERPL-MCNC: 0.4 MG/DL (ref 0–1)
BUN SERPL-MCNC: 12 MG/DL (ref 6–23)
CALCIUM SERPL-MCNC: 9.3 MG/DL (ref 8.3–10.6)
CHLORIDE BLD-SCNC: 105 MMOL/L (ref 99–110)
CO2: 23 MMOL/L (ref 21–32)
CREAT SERPL-MCNC: 1 MG/DL (ref 0.6–1.1)
DIFFERENTIAL TYPE: ABNORMAL
ECHO BSA: 1.82 M2
EKG DIAGNOSIS: NORMAL
EKG Q-T INTERVAL: 352 MS
EKG QRS DURATION: 120 MS
EKG QTC CALCULATION (BAZETT): 456 MS
EKG R AXIS: 35 DEGREES
EKG T AXIS: 9 DEGREES
EKG VENTRICULAR RATE: 101 BPM
EOSINOPHILS ABSOLUTE: 0.1 K/CU MM
EOSINOPHILS RELATIVE PERCENT: 2.1 % (ref 0–3)
GFR, ESTIMATED: 58 ML/MIN/1.73M2
GLUCOSE SERPL-MCNC: 112 MG/DL (ref 70–99)
HCT VFR BLD CALC: 35.2 % (ref 37–47)
HEMOGLOBIN: 11.2 GM/DL (ref 12.5–16)
IMMATURE NEUTROPHIL %: 0.2 % (ref 0–0.43)
LIPASE: 28 IU/L (ref 13–60)
LYMPHOCYTES ABSOLUTE: 0.9 K/CU MM
LYMPHOCYTES RELATIVE PERCENT: 16.7 % (ref 24–44)
MAGNESIUM: 2.1 MG/DL (ref 1.8–2.4)
MCH RBC QN AUTO: 32.4 PG (ref 27–31)
MCHC RBC AUTO-ENTMCNC: 31.8 % (ref 32–36)
MCV RBC AUTO: 101.7 FL (ref 78–100)
MONOCYTES ABSOLUTE: 0.3 K/CU MM
MONOCYTES RELATIVE PERCENT: 6.3 % (ref 0–4)
NEUTROPHILS ABSOLUTE: 3.9 K/CU MM
NEUTROPHILS RELATIVE PERCENT: 73.9 % (ref 36–66)
NUCLEATED RBC %: 0 %
PDW BLD-RTO: 14.5 % (ref 11.7–14.9)
PLATELET # BLD: 207 K/CU MM (ref 140–440)
PMV BLD AUTO: 10.3 FL (ref 7.5–11.1)
POTASSIUM SERPL-SCNC: 4.2 MMOL/L (ref 3.5–5.1)
PRO-BNP: 2838 PG/ML
RBC # BLD: 3.46 M/CU MM (ref 4.2–5.4)
SODIUM BLD-SCNC: 139 MMOL/L (ref 135–145)
T4 FREE SERPL-MCNC: 1.27 NG/DL (ref 0.9–1.8)
TOTAL IMMATURE NEUTOROPHIL: 0.01 K/CU MM
TOTAL NUCLEATED RBC: 0 K/CU MM
TOTAL PROTEIN: 7.3 GM/DL (ref 6.4–8.2)
TROPONIN, HIGH SENSITIVITY: 8 NG/L (ref 0–14)
TROPONIN, HIGH SENSITIVITY: 9 NG/L (ref 0–14)
TSH SERPL DL<=0.005 MIU/L-ACNC: 4.66 UIU/ML (ref 0.27–4.2)
WBC # BLD: 5.3 K/CU MM (ref 4–10.5)

## 2024-06-21 PROCEDURE — 80053 COMPREHEN METABOLIC PANEL: CPT

## 2024-06-21 PROCEDURE — 71045 X-RAY EXAM CHEST 1 VIEW: CPT

## 2024-06-21 PROCEDURE — 83880 ASSAY OF NATRIURETIC PEPTIDE: CPT

## 2024-06-21 PROCEDURE — 84443 ASSAY THYROID STIM HORMONE: CPT

## 2024-06-21 PROCEDURE — 83690 ASSAY OF LIPASE: CPT

## 2024-06-21 PROCEDURE — 93010 ELECTROCARDIOGRAM REPORT: CPT | Performed by: INTERNAL MEDICINE

## 2024-06-21 PROCEDURE — 84484 ASSAY OF TROPONIN QUANT: CPT

## 2024-06-21 PROCEDURE — 83735 ASSAY OF MAGNESIUM: CPT

## 2024-06-21 PROCEDURE — 84439 ASSAY OF FREE THYROXINE: CPT

## 2024-06-21 PROCEDURE — 93005 ELECTROCARDIOGRAM TRACING: CPT | Performed by: EMERGENCY MEDICINE

## 2024-06-21 PROCEDURE — 85025 COMPLETE CBC W/AUTO DIFF WBC: CPT

## 2024-06-21 PROCEDURE — 99285 EMERGENCY DEPT VISIT HI MDM: CPT

## 2024-06-21 RX ORDER — ACETAMINOPHEN 500 MG
1000 TABLET ORAL EVERY 6 HOURS PRN
Qty: 60 TABLET | Refills: 0 | Status: SHIPPED | OUTPATIENT
Start: 2024-06-21 | End: 2024-08-01

## 2024-06-21 RX ORDER — FUROSEMIDE 20 MG/1
20 TABLET ORAL 2 TIMES DAILY
Qty: 14 TABLET | Refills: 0 | Status: SHIPPED | OUTPATIENT
Start: 2024-06-21 | End: 2024-07-11 | Stop reason: SDUPTHER

## 2024-06-21 ASSESSMENT — LIFESTYLE VARIABLES
HOW OFTEN DO YOU HAVE A DRINK CONTAINING ALCOHOL: NEVER
HOW MANY STANDARD DRINKS CONTAINING ALCOHOL DO YOU HAVE ON A TYPICAL DAY: PATIENT DOES NOT DRINK

## 2024-06-21 ASSESSMENT — PAIN DESCRIPTION - ORIENTATION: ORIENTATION: LEFT

## 2024-06-21 ASSESSMENT — PAIN - FUNCTIONAL ASSESSMENT
PAIN_FUNCTIONAL_ASSESSMENT: NONE - DENIES PAIN
PAIN_FUNCTIONAL_ASSESSMENT: 0-10

## 2024-06-21 ASSESSMENT — PAIN DESCRIPTION - DESCRIPTORS: DESCRIPTORS: HEAVINESS

## 2024-06-21 ASSESSMENT — PAIN DESCRIPTION - LOCATION: LOCATION: CHEST

## 2024-06-21 ASSESSMENT — PAIN DESCRIPTION - PAIN TYPE: TYPE: ACUTE PAIN

## 2024-06-21 ASSESSMENT — PAIN SCALES - GENERAL
PAINLEVEL_OUTOF10: 0
PAINLEVEL_OUTOF10: 5

## 2024-06-21 NOTE — DISCHARGE INSTRUCTIONS
Follow-up with cardiology regarding your abnormal blood test result of BNP.  I have included a diuretic for you.

## 2024-06-21 NOTE — ED TRIAGE NOTES
Pt states she was in the hospital last week with afib and is feeling discomfort starting last night after pulling the garbage cans and some today, pt also c/o nausea.

## 2024-06-21 NOTE — ED NOTES
Pt states she was dx with new onset a fib and started on Eliquis, metoprolol tartrate 50 mg BID, and Midodrine 5mg BID. Pt states she was discharged in A.fib from hospital and hasn't been feeling well since she left

## 2024-06-21 NOTE — Clinical Note
Emergency Department Encounter    Patient: Naila Salazar  MRN: [id number]  : 1945  Date of Evaluation: 2024  ED Provider:  Darrian Grant MD    Triage Chief Complaint:   Chest Pain and Nausea    [place]:  Naila Salazar is a 78 y.o. female  patient with multiple medical problems presenting with substernal chest pain after pulling on some garbage can last night.  She states that pain is worse when moving.  No shortness of breath nor diaphoresis.  Pain is nonexertional.  Patient stated she was admitted a week ago for atrial fibrillation.    ROS - see HPI, below listed is current ROS at time of my eval:  Other systems reviewed generally unremarkable.  General:  No fevers, no chills, no weakness  Eyes:  No recent vison changes, no discharge  ENT:  No sore throat, no nasal congestion, no hearing changes  Respiratory:  No shortness of breath, no cough, no wheezing  Skin:  No rash, no pruritis, no easy bruising  Neurologic:  No speech problems, no headache, no extremity numbness, no extremity tingling, no extremity weaknessPsychiatric:  No anxiety  Genitourinary:  No dysuria, no hematuria  Endocrine:  No unexpected weight gain, no unexpected weight loss  Extremities:  no edema, no pain    Past Medical History:   Diagnosis Date    Glaucoma     Hypertension      History reviewed. No pertinent surgical history.  History reviewed. No pertinent family history.  Social History     Socioeconomic History    Marital status:      Spouse name: Not on file    Number of children: Not on file    Years of education: Not on file    Highest education level: Not on file   Occupational History    Not on file   Tobacco Use    Smoking status: Never    Smokeless tobacco: Never   Substance and Sexual Activity    Alcohol use: Not on file    Drug use: Never    Sexual activity: Not Currently   Other Topics Concern    Not on file   Social History Narrative    Not on file     Social Determinants of Health     Financial Resource

## 2024-07-11 ENCOUNTER — OFFICE VISIT (OUTPATIENT)
Dept: CARDIOLOGY CLINIC | Age: 79
End: 2024-07-11

## 2024-07-11 ENCOUNTER — TRANSCRIBE ORDERS (OUTPATIENT)
Dept: CARDIOLOGY CLINIC | Age: 79
End: 2024-07-11

## 2024-07-11 VITALS
HEART RATE: 112 BPM | HEIGHT: 65 IN | BODY MASS INDEX: 24.83 KG/M2 | SYSTOLIC BLOOD PRESSURE: 112 MMHG | WEIGHT: 149 LBS | DIASTOLIC BLOOD PRESSURE: 64 MMHG | OXYGEN SATURATION: 98 %

## 2024-07-11 DIAGNOSIS — I48.91 A-FIB (HCC): Primary | ICD-10-CM

## 2024-07-11 DIAGNOSIS — D68.59 HYPERCOAGULABLE STATE (HCC): ICD-10-CM

## 2024-07-11 DIAGNOSIS — I50.33 ACUTE ON CHRONIC HEART FAILURE WITH PRESERVED EJECTION FRACTION (HFPEF) (HCC): ICD-10-CM

## 2024-07-11 DIAGNOSIS — I48.19 PERSISTENT ATRIAL FIBRILLATION (HCC): Primary | ICD-10-CM

## 2024-07-11 RX ORDER — AMIODARONE HYDROCHLORIDE 200 MG/1
400 TABLET ORAL DAILY
Qty: 60 TABLET | Refills: 0 | Status: SHIPPED | OUTPATIENT
Start: 2024-07-11 | End: 2024-08-10

## 2024-07-11 RX ORDER — FUROSEMIDE 20 MG/1
20 TABLET ORAL 2 TIMES DAILY
Qty: 60 TABLET | Refills: 0 | Status: SHIPPED | OUTPATIENT
Start: 2024-07-11 | End: 2024-08-10

## 2024-07-11 NOTE — PROGRESS NOTES
Cardiology follow-up note    Primary care physician: Mitzy Marcos APRN      History of present illness: Naila is a 78 y.o.year old female  who has prior history of hypertension who is here for posthospitalization follow-up.  In June 2024 she was admitted to the hospital because of atrial fibrillation with rapid ventricular rate.  ALFREDITO was attempted but because of difficult probe placement was abandoned.  Patient was subsequent started on Eliquis.  Today she is here for posthospitalization follow-up.  She feels fatigued and tired with some shortness of breath.  She also endorses bilateral lower extremity edema.  She also states that her heart races quite a bit.  She has been compliant with her medications.    Past medical history:    has a past medical history of Glaucoma and Hypertension.    Past surgical history:   has no past surgical history on file.    Social History:   reports that she has never smoked. She has never used smokeless tobacco. She reports that she does not use drugs.    Family history:  No family history of premature CAD  Allergies   Allergen Reactions    Nasonex [Mometasone Furoate] Swelling       No current facility-administered medications for this visit.    Current Outpatient Medications   Medication Sig Dispense Refill    amiodarone (CORDARONE) 200 MG tablet Take 2 tablets by mouth daily 60 tablet 0    furosemide (LASIX) 20 MG tablet Take 1 tablet by mouth 2 times daily 60 tablet 0    acetaminophen (TYLENOL) 500 MG tablet Take 2 tablets by mouth every 6 hours as needed for Pain or Fever 60 tablet 0    metoprolol tartrate (LOPRESSOR) 50 MG tablet Take 1 tablet by mouth 2 times daily Hold if SBP < 95 or if HR < 55 60 tablet 0    apixaban (ELIQUIS) 5 MG TABS tablet Take 1 tablet by mouth 2 times daily 60 tablet 0    bimatoprost (LUMIGAN) 0.01 % SOLN ophthalmic drops Place 1 drop into both eyes nightly      midodrine (PROAMATINE) 5 MG tablet Take 1 tablet by mouth 2 times daily (with

## 2024-07-12 ENCOUNTER — HOSPITAL ENCOUNTER (OUTPATIENT)
Dept: PHYSICAL THERAPY | Age: 79
Setting detail: THERAPIES SERIES
Discharge: HOME | End: 2024-07-12
Payer: MEDICARE

## 2024-07-12 ENCOUNTER — TELEPHONE (OUTPATIENT)
Dept: CARDIOLOGY CLINIC | Age: 79
End: 2024-07-12

## 2024-07-12 PROCEDURE — 97110 THERAPEUTIC EXERCISES: CPT

## 2024-07-12 PROCEDURE — 97162 PT EVAL MOD COMPLEX 30 MIN: CPT

## 2024-07-12 NOTE — PLAN OF CARE
[facility]  [facility]  [address]  [facility] [address]  Dept: [phone]  Dept Fax: [phone]  Loc: [phone]    PHYSICAL THERAPY PLAN OF CARE: INITIAL EVALUATION    Patient: Naila Salazar (78 y.o. 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 Pt now has difficulties completing transfers, prolonged standing/walking, and ADLs. Pt demo deficits this date that include LE weakness, balance/gait deficits, and poor activity endurance.  Pt will benefit with PT services with progression of strength/ROM, manual and modalities to return to OF. Pt prior to onset of current condition had min/no pain with able to complete full ADLs and work activities. Patient received education on their current pathology and how their condition effects them with their functional activities. Patient understood discussion and questions were answered. Patient understands their activity limitations and understands rational for treatment progression.      Body Structures, Functions, Activity Limitations Requiring Skilled Therapeutic Intervention: Decreased functional mobility , Decreased ADL

## 2024-07-12 NOTE — TELEPHONE ENCOUNTER
[facility]     Dr. Gisell Ty     Cardioversion    Patient Name: Naila Salazar   : 1945   MRN# [id number]     Date of Procedure:  Time: 9 Arrival Time: 8   (Arrival time is scheduled for one (1) hour before procedure is scheduled.)     Hospital: [facility])     X   If you have received orders for blood work and or a chest x-ray, please have         them done on assigned date at [facility],         [facility], or [facility].    X   Please do not have anything by mouth after midnight prior to or 8 hours before         the procedure.    X   You may take your medication with a sip of water unless advised otherwise below.       x Please continue to take Eliquis (apixaban) as directed.     x If you are taking Lasix (furosemide) please do not take it the morning before your procedure.

## 2024-07-12 NOTE — PROGRESS NOTES
mostly independent; 20-29 seconds = variable mobility; > 20 seconds = impaired mobility; > 30 seconds = very high fall risk. Additional scorin.1 seconds in vestibular patients = increased fall risk; > 13.5 seconds in elderly = Increased fall risk)    HR mid tx at highest at 119bpm with lowest 62 after rest.   98% Sat O2 both readings.       ASSESSMENT     Impression: Pt is a 78 year old female presenting with balance and endurance deficits. Pt now has difficulties completing transfers, prolonged standing/walking, and ADLs. Pt demo deficits this date that include LE weakness, balance/gait deficits, and poor activity endurance.  Pt will benefit with PT services with progression of strength/ROM, manual and modalities to return to PLOF. Pt prior to onset of current condition had min/no pain with able to complete full ADLs and work activities. Patient received education on their current pathology and how their condition effects them with their functional activities. Patient understood discussion and questions were answered. Patient understands their activity limitations and understands rational for treatment progression.     Body Structures, Functions, Activity Limitations Requiring Skilled Therapeutic Intervention: Decreased functional mobility , Decreased ADL status, Decreased body mechanics, Decreased strength, Decreased endurance, Decreased balance, Decreased posture    Statement of Medical Necessity: Physical Therapy is both indicated and medically necessary as outlined in the POC to increase the likelihood of meeting the functionally related goals stated below.     Patient's Activity Tolerance: Patient limited by fatigue      Patient's rehabilitation potential/prognosis is considered to be: Good    Factors which may impact rehabilitation potential include: None        GOALS   Patient Goal(s): Improve endurance and balance  Short Term Goals Completed by Defer to long term goals Goal Status

## 2024-07-12 NOTE — FLOWSHEET NOTE
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]  Heart failure, unspecified [I50.9]   Date of Injury/Surgery: Chronic  Treatment Diagnosis:  Decreased LE strength, activity toleranced limited by fatigue, and balance deficits  Insurance/Certification information: Medicare/   Referring Physician:  Francia Bliss   PCP: Mitzy Marcos APRN  Next Doctor Visit: 24  Plan of care signed (Y/N): N, sent 24  Outcome Measure: ABC: 34.4% confidence  Visit# / total visits:    per POC  Pain level: 0/10   Goals:     Patient goals: Improve endurance and balance  Short term goals  Time Frame for Short term goals: Defer to long term goals     Long Term Goals: 6 weeks   Long Term Goal 1: Pt will demo I with HEP/symptom management.   Long Term Goal 2: Pt will demo normal gait mechanics with min/no deficits to ease community mobility.   Long Term Goal 3: Pt will demo 5/5 strength in L LE to improve mobility and performance of functional activity.  Long Term Goal 4: Pt will completed TUG <30 sec to demo improved mobility.   Long Term Goal 5: Pt will demo >25% improvement in confidence per ABC to demo improved functional mobility.   Long Term Goal 6: Pt will complete 5x sit to stand to demo improved LE strength and ease with transfers.        Summary of Evaluation:  Pt is a 78 year old female presenting with balance and endurance deficits. Pt now has difficulties completing transfers, prolonged standing/walking, and ADLs. Pt demo deficits this date that include LE weakness, balance/gait deficits, and poor activity

## 2024-07-15 ENCOUNTER — TELEPHONE (OUTPATIENT)
Dept: CARDIOLOGY CLINIC | Age: 79
End: 2024-07-15

## 2024-07-16 ENCOUNTER — HOSPITAL ENCOUNTER (OUTPATIENT)
Age: 79
Discharge: HOME | End: 2024-07-16
Payer: MEDICARE

## 2024-07-16 DIAGNOSIS — I48.19 PERSISTENT ATRIAL FIBRILLATION (HCC): ICD-10-CM

## 2024-07-16 LAB
ANION GAP SERPL CALCULATED.3IONS-SCNC: 11 MMOL/L (ref 7–16)
BUN SERPL-MCNC: 13 MG/DL (ref 6–23)
CALCIUM SERPL-MCNC: 9 MG/DL (ref 8.3–10.6)
CHLORIDE BLD-SCNC: 102 MMOL/L (ref 99–110)
CO2: 26 MMOL/L (ref 21–32)
CREAT SERPL-MCNC: 1.1 MG/DL (ref 0.6–1.1)
GFR, ESTIMATED: 52 ML/MIN/1.73M2
GLUCOSE SERPL-MCNC: 109 MG/DL (ref 70–99)
POTASSIUM SERPL-SCNC: 4 MMOL/L (ref 3.5–5.1)
SODIUM BLD-SCNC: 139 MMOL/L (ref 135–145)

## 2024-07-16 PROCEDURE — 36415 COLL VENOUS BLD VENIPUNCTURE: CPT

## 2024-07-16 PROCEDURE — 80048 BASIC METABOLIC PNL TOTAL CA: CPT

## 2024-07-16 NOTE — TELEPHONE ENCOUNTER
Patient given instructions over telephone on 7/16/24.  Procedure is scheduled for 7/18/24 @ 9am, w/arrival @ 8am, @ [facility]. Medication/Education Letter gone over with patient. Questions answered, Patient voiced understanding.        Patient was notified that procedure date or time could be changed due to an emergency. Patient voiced understanding.

## 2024-07-18 ENCOUNTER — HOSPITAL ENCOUNTER (OUTPATIENT)
Dept: NON INVASIVE DIAGNOSTICS | Age: 79
Discharge: HOME | End: 2024-07-20
Attending: INTERNAL MEDICINE
Payer: MEDICARE

## 2024-07-18 VITALS
OXYGEN SATURATION: 95 % | DIASTOLIC BLOOD PRESSURE: 57 MMHG | SYSTOLIC BLOOD PRESSURE: 107 MMHG | HEART RATE: 52 BPM | RESPIRATION RATE: 18 BRPM

## 2024-07-18 DIAGNOSIS — I48.91 A-FIB (HCC): ICD-10-CM

## 2024-07-18 DIAGNOSIS — I48.20 CHRONIC A-FIB (HCC): ICD-10-CM

## 2024-07-18 PROCEDURE — 7100000011 HC PHASE II RECOVERY - ADDTL 15 MIN: Performed by: INTERNAL MEDICINE

## 2024-07-18 PROCEDURE — 92960 CARDIOVERSION ELECTRIC EXT: CPT

## 2024-07-18 PROCEDURE — 93005 ELECTROCARDIOGRAM TRACING: CPT | Performed by: INTERNAL MEDICINE

## 2024-07-18 PROCEDURE — 6360000002 HC RX W HCPCS: Performed by: INTERNAL MEDICINE

## 2024-07-18 PROCEDURE — 7100000010 HC PHASE II RECOVERY - FIRST 15 MIN: Performed by: INTERNAL MEDICINE

## 2024-07-18 PROCEDURE — 92960 CARDIOVERSION ELECTRIC EXT: CPT | Performed by: INTERNAL MEDICINE

## 2024-07-18 PROCEDURE — 99152 MOD SED SAME PHYS/QHP 5/>YRS: CPT | Performed by: INTERNAL MEDICINE

## 2024-07-18 RX ORDER — FENTANYL CITRATE 50 UG/ML
INJECTION, SOLUTION INTRAMUSCULAR; INTRAVENOUS PRN
Status: COMPLETED | OUTPATIENT
Start: 2024-07-18 | End: 2024-07-18

## 2024-07-18 RX ORDER — MIDAZOLAM HYDROCHLORIDE 1 MG/ML
INJECTION INTRAMUSCULAR; INTRAVENOUS PRN
Status: COMPLETED | OUTPATIENT
Start: 2024-07-18 | End: 2024-07-18

## 2024-07-18 RX ADMIN — MIDAZOLAM 2 MG: 1 INJECTION INTRAMUSCULAR; INTRAVENOUS at 08:57

## 2024-07-18 RX ADMIN — FENTANYL CITRATE 50 MCG: 50 INJECTION, SOLUTION INTRAMUSCULAR; INTRAVENOUS at 08:58

## 2024-07-18 NOTE — ED PROVIDER NOTES
Emergency Department Encounter     Patient: Naila Salazar  MRN: [id number]  : 1945  Date of Evaluation: 2024  ED Provider:  Darrian Grant MD     Triage Chief Complaint:   Chest Pain and Nausea     [place]:  Naila Salazar is a 78 y.o. female  patient with multiple medical problems presenting with substernal chest pain after pulling on some garbage can last night.  She states that pain is worse when moving.  No shortness of breath nor diaphoresis.  Pain is nonexertional.  Patient stated she was admitted a week ago for atrial fibrillation.     ROS - see HPI, below listed is current ROS at time of my eval:  Other systems reviewed generally unremarkable.  General:  No fevers, no chills, no weakness  Eyes:  No recent vison changes, no discharge  ENT:  No sore throat, no nasal congestion, no hearing changes  Respiratory:  No shortness of breath, no cough, no wheezing  Skin:  No rash, no pruritis, no easy bruising  Neurologic:  No speech problems, no headache, no extremity numbness, no extremity tingling, no extremity weaknessPsychiatric:  No anxiety  Genitourinary:  No dysuria, no hematuria  Endocrine:  No unexpected weight gain, no unexpected weight loss  Extremities:  no edema, no pain     Past Medical History        Past Medical History:   Diagnosis Date    Glaucoma      Hypertension           Past Surgical History   History reviewed. No pertinent surgical history.     Family History   History reviewed. No pertinent family history.     Social History               Socioeconomic History    Marital status:        Spouse name: Not on file    Number of children: Not on file    Years of education: Not on file    Highest education level: Not on file   Occupational History    Not on file   Tobacco Use    Smoking status: Never    Smokeless tobacco: Never   Substance and Sexual Activity    Alcohol use: Not on file    Drug use: Never    Sexual activity: Not Currently   Other Topics Concern    Not on file

## 2024-07-19 LAB
EKG ATRIAL RATE: 51 BPM
EKG ATRIAL RATE: 86 BPM
EKG DIAGNOSIS: NORMAL
EKG DIAGNOSIS: NORMAL
EKG P AXIS: 29 DEGREES
EKG P-R INTERVAL: 168 MS
EKG Q-T INTERVAL: 424 MS
EKG Q-T INTERVAL: 542 MS
EKG QRS DURATION: 128 MS
EKG QRS DURATION: 132 MS
EKG QTC CALCULATION (BAZETT): 499 MS
EKG QTC CALCULATION (BAZETT): 521 MS
EKG R AXIS: 40 DEGREES
EKG R AXIS: 50 DEGREES
EKG T AXIS: -39 DEGREES
EKG T AXIS: -6 DEGREES
EKG VENTRICULAR RATE: 51 BPM
EKG VENTRICULAR RATE: 91 BPM

## 2024-07-24 ENCOUNTER — HOSPITAL ENCOUNTER (OUTPATIENT)
Dept: PHYSICAL THERAPY | Age: 79
Setting detail: THERAPIES SERIES
Discharge: HOME | End: 2024-07-24
Payer: MEDICARE

## 2024-07-24 PROCEDURE — 97110 THERAPEUTIC EXERCISES: CPT

## 2024-07-24 PROCEDURE — 97112 NEUROMUSCULAR REEDUCATION: CPT

## 2024-07-24 NOTE — FLOWSHEET NOTE
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]  Heart failure, unspecified [I50.9]   Date of Injury/Surgery: Chronic  Treatment Diagnosis:  Decreased LE strength, activity toleranced limited by fatigue, and balance deficits  Insurance/Certification information: Medicare/   Referring Physician:  Francia Bliss   PCP: Mitzy Marcos APRN  Next Doctor Visit: 24  Plan of care signed (Y/N): Yes  Outcome Measure: ABC: 34.4% confidence  Visit# / total visits:   2/12 per POC  Pain level: 0/10   Goals:     Patient goals: Improve endurance and balance  Short term goals  Time Frame for Short term goals: Defer to long term goals     Long Term Goals: 6 weeks   Long Term Goal 1: Pt will demo I with HEP/symptom management.   Long Term Goal 2: Pt will demo normal gait mechanics with min/no deficits to ease community mobility.   Long Term Goal 3: Pt will demo 5/5 strength in L LE to improve mobility and performance of functional activity.  Long Term Goal 4: Pt will completed TUG <30 sec to demo improved mobility.   Long Term Goal 5: Pt will demo >25% improvement in confidence per ABC to demo improved functional mobility.   Long Term Goal 6: Pt will complete 5x sit to stand to demo improved LE strength and ease with transfers.        Summary of Evaluation:  Pt is a 78 year old female presenting with balance and endurance deficits. Pt now has difficulties completing transfers, prolonged standing/walking, and ADLs. Pt demo deficits this date that include LE weakness, balance/gait deficits, and poor activity endurance.

## 2024-07-26 ENCOUNTER — HOSPITAL ENCOUNTER (OUTPATIENT)
Dept: PHYSICAL THERAPY | Age: 79
Setting detail: THERAPIES SERIES
Discharge: HOME | End: 2024-07-26
Payer: MEDICARE

## 2024-07-26 PROCEDURE — 97112 NEUROMUSCULAR REEDUCATION: CPT

## 2024-07-26 PROCEDURE — 97110 THERAPEUTIC EXERCISES: CPT

## 2024-07-26 PROCEDURE — 97530 THERAPEUTIC ACTIVITIES: CPT

## 2024-08-01 ENCOUNTER — OFFICE VISIT (OUTPATIENT)
Dept: CARDIOLOGY CLINIC | Age: 79
End: 2024-08-01
Payer: MEDICARE

## 2024-08-01 VITALS
SYSTOLIC BLOOD PRESSURE: 120 MMHG | BODY MASS INDEX: 20.99 KG/M2 | WEIGHT: 126 LBS | HEART RATE: 69 BPM | HEIGHT: 65 IN | DIASTOLIC BLOOD PRESSURE: 76 MMHG

## 2024-08-01 DIAGNOSIS — D68.59 HYPERCOAGULABLE STATE (HCC): ICD-10-CM

## 2024-08-01 DIAGNOSIS — I48.19 PERSISTENT ATRIAL FIBRILLATION (HCC): Primary | ICD-10-CM

## 2024-08-01 DIAGNOSIS — I50.32 CHRONIC HEART FAILURE WITH PRESERVED EJECTION FRACTION (HFPEF) (HCC): ICD-10-CM

## 2024-08-01 PROCEDURE — G8427 DOCREV CUR MEDS BY ELIG CLIN: HCPCS | Performed by: INTERNAL MEDICINE

## 2024-08-01 PROCEDURE — 1123F ACP DISCUSS/DSCN MKR DOCD: CPT | Performed by: INTERNAL MEDICINE

## 2024-08-01 PROCEDURE — 1090F PRES/ABSN URINE INCON ASSESS: CPT | Performed by: INTERNAL MEDICINE

## 2024-08-01 PROCEDURE — 99214 OFFICE O/P EST MOD 30 MIN: CPT | Performed by: INTERNAL MEDICINE

## 2024-08-01 PROCEDURE — G8399 PT W/DXA RESULTS DOCUMENT: HCPCS | Performed by: INTERNAL MEDICINE

## 2024-08-01 PROCEDURE — G8420 CALC BMI NORM PARAMETERS: HCPCS | Performed by: INTERNAL MEDICINE

## 2024-08-01 PROCEDURE — 93000 ELECTROCARDIOGRAM COMPLETE: CPT | Performed by: INTERNAL MEDICINE

## 2024-08-01 PROCEDURE — 1036F TOBACCO NON-USER: CPT | Performed by: INTERNAL MEDICINE

## 2024-08-01 RX ORDER — AMIODARONE HYDROCHLORIDE 100 MG/1
100 TABLET ORAL DAILY
Qty: 30 TABLET | Refills: 0 | Status: SHIPPED | OUTPATIENT
Start: 2024-08-01 | End: 2024-08-01

## 2024-08-01 RX ORDER — AMIODARONE HYDROCHLORIDE 100 MG/1
100 TABLET ORAL DAILY
Qty: 30 TABLET | Refills: 1 | Status: SHIPPED | OUTPATIENT
Start: 2024-08-01 | End: 2024-09-30

## 2024-08-01 NOTE — PROGRESS NOTES
Cardiology follow-up note    Primary care physician: Mitzy Marcos APRN      History of present illness: Naila is a 78 y.o.year old female  who has prior history of hypertension who is here for posthospitalization follow-up.  In June 2024 she was admitted to the hospital because of atrial fibrillation with rapid ventricular rate.  ALFREDITO was attempted but because of difficult probe placement was abandoned.  Patient was subsequent started on Eliquis.  Subsequently she was started on amiodarone and cardioversion was performed in July 2024.  Today she is here for post cardioversion follow-up.  She has a sinus infection but otherwise feels well.  She has no leg swelling, she denies any palpitations, shortness of breath.  Past medical history:    has a past medical history of Glaucoma and Hypertension.    Past surgical history:   has no past surgical history on file.    Social History:   reports that she has never smoked. She has never used smokeless tobacco. She reports that she does not use drugs.    Family history:  No family history of premature CAD  Allergies   Allergen Reactions    Nasonex [Mometasone Furoate] Swelling       No current facility-administered medications for this visit.    Current Outpatient Medications   Medication Sig Dispense Refill    amiodarone (CORDARONE) 200 MG tablet Take 2 tablets by mouth daily 60 tablet 0    furosemide (LASIX) 20 MG tablet Take 1 tablet by mouth 2 times daily 60 tablet 0    acetaminophen (TYLENOL) 500 MG tablet Take 2 tablets by mouth every 6 hours as needed for Pain or Fever 60 tablet 0    metoprolol tartrate (LOPRESSOR) 50 MG tablet Take 1 tablet by mouth 2 times daily Hold if SBP < 95 or if HR < 55 60 tablet 0    apixaban (ELIQUIS) 5 MG TABS tablet Take 1 tablet by mouth 2 times daily 60 tablet 0    bimatoprost (LUMIGAN) 0.01 % SOLN ophthalmic drops Place 1 drop into both eyes nightly       No current facility-administered medications for this visit.     Review

## 2024-08-01 NOTE — PATIENT INSTRUCTIONS
Will send new scripts for amiodarone. Plz stop taking it after 2 months.  Please reduce Lasix to 20 mg once day.

## 2024-08-26 RX ORDER — FUROSEMIDE 20 MG
20 TABLET ORAL 2 TIMES DAILY
Qty: 60 TABLET | Refills: 5 | Status: SHIPPED | OUTPATIENT
Start: 2024-08-26

## 2024-08-28 ENCOUNTER — HOSPITAL ENCOUNTER (OUTPATIENT)
Dept: PHYSICAL THERAPY | Age: 79
Setting detail: THERAPIES SERIES
Discharge: HOME | End: 2024-08-28
Payer: MEDICARE

## 2024-08-28 PROCEDURE — 97530 THERAPEUTIC ACTIVITIES: CPT

## 2024-08-28 PROCEDURE — 97110 THERAPEUTIC EXERCISES: CPT

## 2024-08-28 NOTE — PROGRESS NOTES
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 Had a bad sinus infection that she has recovered from. Been completing stairs and pulling trash cans up driveway with rest as needed.        Plan of Care/Treatment to date:  [x] Therapeutic Exercise    [] Modalities:  [x] Therapeutic Activity     [] Ultrasound  [] Electrical Stimulation  [x] Gait Training      [] Cervical Traction   [] Lumbar Traction  [x] Neuromuscular Re-education  [] Cold/hotpack [] Iontophoresis  [x] Instruction in HEP      Other:  [] Manual Therapy       []  Vasopneumatic  [] Aquatic Therapy       []   Dry Needle Therapy                      Objective/Significant Findings At Last Visit/Comments:    At arrival ambulation without SPC with min reduced step length   TU.70 sec without AD  Able to stand without UE assistance   5x sit to stand: 34.03  with UE assistance   L LE 4+/5    R: 5/5 in all dir   20 sec B with tandem stance    Assessment:   Naila has completed 4 visits since start of care on 24. Dues to sickness having to take extended break from therapy but able to work on home program. Demo improvement in transfers, gait and tolerance with activity. Demo progress with expected additional gains to be with continued therapy. Pt  would continue to benefit from skilled therapy interventions to address remaining impairments, improve mobility and strength and progress toward goal completion while reducing risk for re-injury or further decline. Pt agrees to this plan.       Goal Status:  [] Achieved [x] Partially Achieved  [] Not Achieved     Patient goals: Improve endurance and balance  Progressing   Short term goals  Time Frame for Short term goals: Defer to long term goals      Long Term Goals: 6 weeks   Long Term Goal 1: Pt will demo I with HEP/symptom management.  Progressing   Long Term Goal 2: Pt will demo normal gait mechanics with min/no deficits to ease community mobility. Partially MET   Long Term Goal 3: Pt will demo 5/5 strength in L LE to improve mobility and performance of functional activity. Mostly MET   Long Term Goal 4: Pt will completed TUG <30 sec to demo improved mobility. MET   Long Term Goal 5: Pt will demo >25% improvement in confidence per ABC to demo improved functional mobility.  NT   Long Term Goal 6: Pt will complete 5x sit to stand to demo improved LE strength and ease with transfers. MET              Patient Status: [x] Continue per initial plan of Care     [] Patient now discharged     [] Additional visits requested, Please re-certify for additional visits:           If we are requesting more visits, we fully anticipate the patient's condition is expected to improve within the treatment timeframe we are requesting.    Electronically signed by:  Carlitos Jameson, PT, DPT, OCS  8/28/2024, 6:50 PM    8/28/2024 6:51 PM   If you have any questions or concerns, please don't hesitate to call.  Thank you for your referral.    Physician Signature:______________________ Date:______ Time: ________  By signing above, therapist’s plan is approved by physician

## 2024-08-28 NOTE — FLOWSHEET NOTE
LE weakness, balance/gait deficits, and poor activity endurance. Pt will benefit with PT services with progression of strength/ROM, manual and modalities to return to PLOF. Pt prior to onset of current condition had min/no pain with able to complete full ADLs and work activities. Patient received education on their current pathology and how their condition effects them with their functional activities. Patient understood discussion and questions were answered. Patient understands their activity limitations and understands rational for treatment progression.         Subjective:  Pt reports she has been feeling better. Had a bad sinus infection that she has recovered from. Been completing stairs and pulling trash cans up driveway with rest as needed.         Any changes in Ambulatory Summary Sheet?  None        Objective:         At arrival ambulation without SPC with min reduced step length   TU.70 sec without AD  Able to stand without UE assistance   5x sit to stand: 34.03  with UE assistance   L LE 4+/5    R: 5/5 in all dir   20 sec B with tandem stance    Exercises: (No more than 4 columns)   Exercise/Equipment 24 #1 24 #2 24 #3 24  #4       pulseOx monitoring   stable pulseOx monitoring   stable    WARM UP        Nustep  5' lv 4    5' lv 4 LV4  4'           TABLE       *Hip adduction iso 10x1 with 5\" hold      Seated ADD   20* 3\"                            STANDING       *Marches with UE support 10x1 each  2*10  2*10    *Hip abduction 10x1 each 2*10 2*10    STS   19\" 8*           Amb no AD H/T more intentional step   No AD   100 ft                       PROPRIOCEPTION       *Romberg stance EO/EC 20\"x2 20\" 2*  30s ea. Airex  20\"x2   Tandem EO/EC  20\" ea. R/L 30s ea. R/L Airex next   Perturbations on airex     20\"x3   EO/EC   Airex marches     20x2          MODALITIES                         Other Therapeutic Activities/Education: Patient received education on their current pathology and  how their condition effects them with their functional activities. Patient understood discussion and questions were answered. Patient understands their activity limitations and understands rational for treatment progression.       Home Exercise Program: *HO issued, reviewed and discussed with patient. All questions answered. Pt agreed to comply.        Manual Treatments:  --      Modalities:  --      Communication with other providers:  POC sent 7/12/24   PN completed 8/28/24       Assessment:  (Response towards treatment session) (Pain Rating)  Naila has completed 3 visits since start of care on 7/12/24. Dues to sickness having to take extended break from therapy but able to work on home program. Demo improvement in transfers, gait and tolerance with activity. Demo progress with expected additional gains to be with continued therapy. Pt would continue to benefit from skilled therapy interventions to address remaining impairments, improve mobility and strength and progress toward goal completion while reducing risk for re-injury or further decline. Pt agrees to this plan.     Pain: 0/10      Pt is a 78 year old female presenting with balance and endurance deficits. Pt now has difficulties completing transfers, prolonged standing/walking, and ADLs. Pt demo deficits this date that include LE weakness, balance/gait deficits, and poor activity endurance. Pt will benefit with PT services with progression of strength/ROM, manual and modalities to return to PLOF. Pt prior to onset of current condition had min/no pain with able to complete full ADLs and work activities. Patient received education on their current pathology and how their condition effects them with their functional activities. Patient understood discussion and questions were answered. Patient understands their activity limitations and understands rational for treatment progression.     Plan for Next Session:   Specific Instructions for Next Treatment:

## 2024-09-04 ENCOUNTER — HOSPITAL ENCOUNTER (OUTPATIENT)
Dept: PHYSICAL THERAPY | Age: 79
Setting detail: THERAPIES SERIES
Discharge: HOME | End: 2024-09-04
Payer: MEDICARE

## 2024-09-04 PROCEDURE — 97112 NEUROMUSCULAR REEDUCATION: CPT

## 2024-09-04 PROCEDURE — 97530 THERAPEUTIC ACTIVITIES: CPT

## 2024-09-04 NOTE — FLOWSHEET NOTE
LE weakness, balance/gait deficits, and poor activity endurance. Pt will benefit with PT services with progression of strength/ROM, manual and modalities to return to PLOF. Pt prior to onset of current condition had min/no pain with able to complete full ADLs and work activities. Patient received education on their current pathology and how their condition effects them with their functional activities. Patient understood discussion and questions were answered. Patient understands their activity limitations and understands rational for treatment progression.         Subjective:  Pt reports she continues to feel better. No issues after last visit. Went to the grocery store for the first time in 3 months, pushed the cart and able to get the groceries into the car with increased fatigue. Starting to carry heavier objects. Using the SPC only to the [employer].        Any changes in Ambulatory Summary Sheet?  None        Objective:       At arrival ambulation without SPC with reduced freddie.  Able to complete sit to stand without UE assistance    CGA with balance activities with min UE use for recovery.     Exercises: (No more than 4 columns)   Exercise/Equipment 8/28/24  #4 9/4/24  #5          WARM UP      Nustep LV4  4'  Lv5   5'         TABLE     *Hip adduction iso     Seated ADD                       STANDING     *Marches with UE support  2 laps in // bars  3#  weights    *Hip abduction  Sidestepping in // bars 3# weight    STS  5x2        Amb no AD H/T more intentional step     FM bwd stepping   10#  5x             PROPRIOCEPTION     *Romberg stance EO/EC Airex  20\"x2    Tandem EO/EC Airex next 20\"x3 airex    Perturbations on airex  20\"x3   EO/EC 20\"x3   EO/EC   Airex marches  20x2 20x2   Lateral step overs   Red foam 10x2    MODALITIES                   Other Therapeutic Activities/Education: --      Home Exercise Program: *HO issued, reviewed and discussed with patient. All questions answered. Pt agreed to comply.

## 2024-09-11 ENCOUNTER — HOSPITAL ENCOUNTER (OUTPATIENT)
Dept: PHYSICAL THERAPY | Age: 79
Setting detail: THERAPIES SERIES
Discharge: HOME | End: 2024-09-11
Payer: MEDICARE

## 2024-09-11 PROCEDURE — 97530 THERAPEUTIC ACTIVITIES: CPT

## 2024-09-11 PROCEDURE — 97112 NEUROMUSCULAR REEDUCATION: CPT

## 2024-09-18 ENCOUNTER — HOSPITAL ENCOUNTER (OUTPATIENT)
Dept: PHYSICAL THERAPY | Age: 79
Setting detail: THERAPIES SERIES
Discharge: HOME | End: 2024-09-18
Payer: MEDICARE

## 2024-09-18 PROCEDURE — 97110 THERAPEUTIC EXERCISES: CPT

## 2024-09-18 PROCEDURE — 97530 THERAPEUTIC ACTIVITIES: CPT

## 2024-09-18 PROCEDURE — 97112 NEUROMUSCULAR REEDUCATION: CPT

## 2024-09-25 ENCOUNTER — HOSPITAL ENCOUNTER (OUTPATIENT)
Dept: PHYSICAL THERAPY | Age: 79
Setting detail: THERAPIES SERIES
Discharge: HOME | End: 2024-09-25
Payer: MEDICARE

## 2024-09-25 PROCEDURE — 97112 NEUROMUSCULAR REEDUCATION: CPT

## 2024-09-25 PROCEDURE — 97110 THERAPEUTIC EXERCISES: CPT

## 2024-09-25 PROCEDURE — 97530 THERAPEUTIC ACTIVITIES: CPT

## 2024-10-14 RX ORDER — AMIODARONE HYDROCHLORIDE 100 MG/1
100 TABLET ORAL DAILY
Qty: 30 TABLET | Refills: 5 | OUTPATIENT
Start: 2024-10-14

## 2024-10-17 ENCOUNTER — TELEPHONE (OUTPATIENT)
Dept: CARDIOLOGY CLINIC | Age: 79
End: 2024-10-17

## 2024-10-17 NOTE — TELEPHONE ENCOUNTER
Pt wants to know if it is normal to have chest pains after having a cardio version done???  Yesterday she had a real bad one . Non today but she has had them 2 time this week in the mornings

## 2024-10-23 ENCOUNTER — HOSPITAL ENCOUNTER (OUTPATIENT)
Dept: PHYSICAL THERAPY | Age: 79
Setting detail: THERAPIES SERIES
Discharge: HOME | End: 2024-10-23
Payer: MEDICARE

## 2024-10-23 PROCEDURE — 97110 THERAPEUTIC EXERCISES: CPT

## 2024-10-23 PROCEDURE — 97530 THERAPEUTIC ACTIVITIES: CPT

## 2024-10-23 PROCEDURE — 97112 NEUROMUSCULAR REEDUCATION: CPT

## 2024-10-23 NOTE — DISCHARGE SUMMARY
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 She notes that her strength, endurance, and balance continue to improve. Going to [facility] once a week as well. No longer using the cane. No falls.             Plan of Care/Treatment to date:  [x] Therapeutic Exercise    [] Modalities:  [x] Therapeutic Activity     [] Ultrasound  [] Electrical Stimulation  [x] Gait Training      [] Cervical Traction   [] Lumbar Traction  [x] Neuromuscular Re-education  [] Cold/hotpack [] Iontophoresis  [x] Instruction in HEP      Other:  [] Manual Therapy       []  Vasopneumatic  [] Aquatic Therapy       []   Dry Needle Therapy                      Objective/Significant Findings At Last Visit/Comments:    At arrival ambulation without SPC with min reduced step length   TUG: 15.32 sec without AD  Able to stand without UE assistance   5x sit to stand:  25.29 with UE assistance   L LE 4+/5    R: 5/5 in all dir   20 sec B with tandem stance  Limited to 664ft before needing to sit to EOB.  Mod JAMILAH 7/10 post ambulation.   FERNANDEZ/56 low fall risk      ABC confidence scale: 76%         Assessment:    Naila has completed 9 visits since start of care on

## 2024-10-23 NOTE — FLOWSHEET NOTE
comply.        Manual Treatments:  --      Modalities:  --      Communication with other providers:  POC sent 7/12/24   PN completed 8/28/24    D/c completed       Assessment: Naila has completed 9 visits since start of care on 7/12/24. Demo additional improvement in transfers, gait and tolerance with activity since last visit 30 days ago. Demo progress with additional gains to be with continued with home program. Due to improvement in symptom improvement and with objective measures. Low fall risk with greater tolerance. Will discharge with home program. Pt agrees to this plan.        End session pain: 0/10     Plan for Next Session:   Specific Instructions for Next Treatment: Progress balance, endurance, and LE strength      Time In / Time Out: 7121-5717      Timed Code/Total Treatment Minutes:  40/40'         10' TA 15' TE 15' NR       Next Progress Note due:  Eval on 7/12/24.   PN completed 8/28/24       Plan of Care Interventions:  [x] Therapeutic Exercise  [] Modalities:  [x] Therapeutic Activity     [] Ultrasound  [] Estim  [x] Gait Training      [] Cervical Traction [] Lumbar Traction  [x] Neuromuscular Re-education    [] Cold/hotpack [] Iontophoresis   [x] Instruction in HEP      [] Vasopneumatic   [] Dry Needling    [] Manual Therapy               [] Aquatic Therapy              Electronically signed by:  Carlitos Jameson, PT, DPT, OCS     10/23/2024 9:37 AM

## 2024-12-12 ENCOUNTER — OFFICE VISIT (OUTPATIENT)
Dept: CARDIOLOGY CLINIC | Age: 79
End: 2024-12-12

## 2024-12-12 VITALS — DIASTOLIC BLOOD PRESSURE: 68 MMHG | HEART RATE: 58 BPM | SYSTOLIC BLOOD PRESSURE: 128 MMHG

## 2024-12-12 DIAGNOSIS — I50.32 CHRONIC HEART FAILURE WITH PRESERVED EJECTION FRACTION (HFPEF) (HCC): Primary | ICD-10-CM

## 2024-12-12 DIAGNOSIS — I48.0 PAROXYSMAL ATRIAL FIBRILLATION (HCC): ICD-10-CM

## 2024-12-12 DIAGNOSIS — I20.9 ANGINA PECTORIS (HCC): ICD-10-CM

## 2024-12-12 NOTE — PROGRESS NOTES
Fever 60 tablet 0     No current facility-administered medications for this visit.     Review of Systems:   Constitutional: No Fever or Weight Loss   Eyes: No Decreased Vision  ENT: No Headaches, Hearing Loss or Vertigo  Cardiovascular: As per HPI  Respiratory: As per HPI  Gastrointestinal: No abdominal pain, appetite loss, blood in stools, constipation, diarrhea or heartburn  Genitourinary: No dysuria, trouble voiding, or hematuria  Musculoskeletal:  No gait disturbance, weakness or joint complaints  Integumentary: No rash or pruritis  Neurological: No TIA or stroke symptoms  Psychiatric: No anxiety or depression  Endocrine: No malaise, fatigue or temperature intolerance  Hematologic/Lymphatic: No bleeding problems, blood clots or swollen lymph nodes  Allergic/Immunologic: No nasal congestion or hives  All systems negative except as marked.        Physical Examination:    Vitals:    12/12/24 1044   BP: 128/68   Pulse: 58       General Appearance:  No distress, conversant    Constitutional:  No acute distress, Non-toxic appearance.    HENT:  Normocephalic, Atraumatic, Bilateral external ears normal, Oropharynx moist,   Eyes:  PERRL, EOMI, Conjunctiva normal, No discharge.   Respiratory:  Normal breath sounds, No respiratory distress, No wheezing  Cardiovascular: S1, S2, no murmurs, gallops. JVD wnl  Abdomen /GI:  Bowel sounds normal, Soft, No tenderness   Genitourinary: No costovertebral angle tenderness   Musculoskeletal: Bilateral lower extremity 2+ edema.   Integument:  Well hydrated, no rash   Neurologic:  Alert & oriented x 3, no focal deficits noted           Medical decision making and Data review:    EKG: Her ECG shows sinus rhythm with right bundle branch block and diffuse T wave inversions which are unchanged compared to previous EKG on 7/18/2024.    ECHO on 6/12/2024: Left Ventricle: Normal left ventricular systolic function with a visually estimated EF of 55 - 60%. Left ventricle size is normal. Normal

## 2024-12-12 NOTE — PATIENT INSTRUCTIONS
We are committed to providing you the best care possible.    If you receive a survey after visiting one of our offices, please take time to share your experience concerning your physician office visit.  These surveys are confidential and no health information about you is shared.    We are eager to improve for you and we are counting on your feedback to help make that happen.      Thank you for allowing us to care for you today!   We want to ensure we can follow your treatment plan and we strive to give you the best outcomes and experience possible.   If you ever have a life threatening emergency and call 911 - for an ambulance (EMS)   Our providers can only care for you at:   [facility] or [facility].   Even if you have someone take you or you drive yourself we can only care for you in a [facility] facility. Our providers are not setup at the other healthcare locations!   **It is YOUR responsibilty to bring medication bottles and/or updated medication list to EACH APPOINTMENT. This will allow us to better serve you and all your healthcare needs**  Please be informed that if you contact our office outside of normal business hours the physician on call cannot help with any scheduling or rescheduling issues, procedure instruction questions or any type of medication issue.    We advise you for any urgent/emergency that you go to the nearest emergency room!    PLEASE CALL OUR OFFICE DURING NORMAL BUSINESS HOURS    Monday - Friday   8 am to 5 pm    [place]: [phone]    [place]: [phone]    [place]:  [phone]

## 2024-12-13 ENCOUNTER — TELEPHONE (OUTPATIENT)
Dept: CARDIOLOGY CLINIC | Age: 79
End: 2024-12-13

## 2024-12-17 ENCOUNTER — TELEPHONE (OUTPATIENT)
Dept: CARDIOLOGY CLINIC | Age: 79
End: 2024-12-17

## 2024-12-26 ENCOUNTER — TELEPHONE (OUTPATIENT)
Dept: CARDIOLOGY CLINIC | Age: 79
End: 2024-12-26

## 2024-12-26 NOTE — TELEPHONE ENCOUNTER
Notified       Nuclear lexiscan stress test with myocardial perfusion     Image quality is good.    Stress Test: A pharmacological stress test was performed using regadenoson (Lexiscan). 50 mg of aminophylline given as a reversal agent. Hemodynamics are adequate for diagnosis. Blood pressure demonstrated a normal response and heart rate demonstrated a normal response to stress. The patient's heart rate recovery was normal.    No evidence of ischemia.  Calculated ejection fraction 63%.     This is a Lexiscan nuclear stress test report.  Patient was administered 0.4 mg IV Lexiscan.  Post Lexiscan patient complained of diaphoresis, shortness of breath and flushing.  50 mg of aminophylline was given.  ECG interpretation:     Baseline EKG showed sinus rhythm.  There were no significant ischemic changes noted.  Post Lexiscan administration there were no significant ischemic changes noted either.  No arrhythmias were noted as well.     Nuclear stress imaging interpretation:     Both rest and stress images showed normal tracer uptake in all myocardial segments.  Calculated ejection fraction was noted to be 63% with no significant wall motion abnormalities.

## 2025-01-16 ENCOUNTER — OFFICE VISIT (OUTPATIENT)
Dept: CARDIOLOGY CLINIC | Age: 80
End: 2025-01-16
Payer: MEDICARE

## 2025-01-16 VITALS
HEIGHT: 66 IN | HEART RATE: 58 BPM | BODY MASS INDEX: 22.66 KG/M2 | DIASTOLIC BLOOD PRESSURE: 78 MMHG | SYSTOLIC BLOOD PRESSURE: 136 MMHG | WEIGHT: 141 LBS

## 2025-01-16 DIAGNOSIS — D68.59 HYPERCOAGULABLE STATE (HCC): ICD-10-CM

## 2025-01-16 DIAGNOSIS — I48.0 PAROXYSMAL ATRIAL FIBRILLATION (HCC): ICD-10-CM

## 2025-01-16 DIAGNOSIS — I20.9 ANGINA PECTORIS (HCC): Primary | ICD-10-CM

## 2025-01-16 PROCEDURE — G8399 PT W/DXA RESULTS DOCUMENT: HCPCS | Performed by: INTERNAL MEDICINE

## 2025-01-16 PROCEDURE — 99214 OFFICE O/P EST MOD 30 MIN: CPT | Performed by: INTERNAL MEDICINE

## 2025-01-16 PROCEDURE — 1123F ACP DISCUSS/DSCN MKR DOCD: CPT | Performed by: INTERNAL MEDICINE

## 2025-01-16 PROCEDURE — 1090F PRES/ABSN URINE INCON ASSESS: CPT | Performed by: INTERNAL MEDICINE

## 2025-01-16 PROCEDURE — 1036F TOBACCO NON-USER: CPT | Performed by: INTERNAL MEDICINE

## 2025-01-16 PROCEDURE — G8420 CALC BMI NORM PARAMETERS: HCPCS | Performed by: INTERNAL MEDICINE

## 2025-01-16 PROCEDURE — G8428 CUR MEDS NOT DOCUMENT: HCPCS | Performed by: INTERNAL MEDICINE

## 2025-01-16 NOTE — PROGRESS NOTES
Cardiology follow-up note    Primary care physician: Mitzy Marcos APRN      History of present illness: Naila is a 79 y.o.year old female  who has prior history of hypertension who is here for follow-up.  In June 2024 she was admitted to the hospital because of atrial fibrillation with rapid ventricular rate.  ALFREDITO was attempted but because of difficult probe placement was abandoned.  Patient was subsequent started on Eliquis.  Subsequently she was started on amiodarone and cardioversion was performed in July 2024.  I saw her in December 2024.  At that time because of intermittent chest discomfort a nuclear stress test was ordered.  Her stress perfusion was noted to be within normal limits.  Today she is here for follow-up after her testing.  She states that she continues to have intermittent chest discomfort.  It is retrosternal and at times goes to the back.  It can happen at rest and with activity.  She also thinks that when she walks her heart rate does not go up as much as she would like it to be.  She has been off amiodarone for past 4 months.  She denies any symptoms of palpitations. She has been compliant with her medications.    Past medical history:    has a past medical history of Glaucoma and Hypertension.    Past surgical history:   has no past surgical history on file.    Social History:   reports that she has never smoked. She has never used smokeless tobacco. She reports that she does not use drugs.    Family history:  No family history of premature CAD  Allergies   Allergen Reactions    Nasonex [Mometasone Furoate] Swelling       No current facility-administered medications for this visit.    Current Outpatient Medications   Medication Sig Dispense Refill    apixaban (ELIQUIS) 5 MG TABS tablet Take 1 tablet by mouth 2 times daily 60 tablet 5    furosemide (LASIX) 20 MG tablet Take 1 tablet by mouth 2 times daily 60 tablet 5    metoprolol tartrate (LOPRESSOR) 50 MG tablet Take 1 tablet by

## 2025-01-17 ENCOUNTER — TELEPHONE (OUTPATIENT)
Dept: CARDIOLOGY CLINIC | Age: 80
End: 2025-01-17

## 2025-01-17 DIAGNOSIS — R07.9 CHEST PAIN, UNSPECIFIED TYPE: ICD-10-CM

## 2025-01-17 DIAGNOSIS — I48.0 PAROXYSMAL ATRIAL FIBRILLATION (HCC): ICD-10-CM

## 2025-01-17 DIAGNOSIS — I50.32 CHRONIC HEART FAILURE WITH PRESERVED EJECTION FRACTION (HFPEF) (HCC): ICD-10-CM

## 2025-01-17 DIAGNOSIS — I20.9 ANGINA PECTORIS (HCC): ICD-10-CM

## 2025-01-17 DIAGNOSIS — Z01.810 PRE-OPERATIVE CARDIOVASCULAR EXAMINATION: Primary | ICD-10-CM

## 2025-01-17 NOTE — TELEPHONE ENCOUNTER
[facility]      Sayed Pako Lanier     LEFT HEART CATHETERIZATION WITH POSSIBLE PERCUTANEOUS CORONARY INTERVENTION                      Patient Name: Naila Salazar   : 1945  MRN# [id number]    Date of Procedure: 25 Time: 7 Arrival Time: 545    The catheterization and angiogram are usually outpatient procedures, however if stenting is needed you may need to stay overnight. You will need to arrive at the hospital two hours before the procedure.  You will go to registration in the main lobby.  You will need to arrange for someone to drive you home.      HOSPITAL:  [facility] ([facility])      X   If you have received orders for blood work and or a chest x-ray, please have         them done on assigned date at [facility],           [facility], or [facility].     X Please do not have anything by mouth after midnight prior to or 8 hours before   the procedure.    X You may take your medications with a sip of water in the morning of your               procedure or take them with you to the hospital                    x If you are taking Lasix (furosemide)   please do not take it the morning of your procedure.           x If you take  Eliquis, you should hold it for 48 hours before your procedure.

## 2025-01-17 NOTE — TELEPHONE ENCOUNTER
Pt notified of procedure date and time. Pt requested for Friday procedure d/t daughter needing to transport her. Pt agreed on procedure date of 1/24/25. Cath lab notified. Pt notified that once procedure was placed on schedule for 1/24/25 someone with procedure scheduling will call her with procedure time. Pt voiced understanding.    left chest and upper back pain since yesterday.

## 2025-01-23 ENCOUNTER — HOSPITAL ENCOUNTER (OUTPATIENT)
Age: 80
Discharge: HOME | End: 2025-01-23
Payer: MEDICARE

## 2025-01-23 ENCOUNTER — HOSPITAL ENCOUNTER (OUTPATIENT)
Dept: GENERAL RADIOLOGY | Age: 80
Discharge: HOME | End: 2025-01-23
Payer: MEDICARE

## 2025-01-23 DIAGNOSIS — Z01.810 PRE-OPERATIVE CARDIOVASCULAR EXAMINATION: ICD-10-CM

## 2025-01-23 LAB
ABO + RH BLD: NORMAL
ANION GAP SERPL CALCULATED.3IONS-SCNC: 10 MMOL/L (ref 9–17)
BLOOD BANK COMMENT: NORMAL
BLOOD BANK SAMPLE EXPIRATION: NORMAL
BLOOD GROUP ANTIBODIES SERPL: NEGATIVE
BUN SERPL-MCNC: 17 MG/DL (ref 7–20)
CALCIUM SERPL-MCNC: 9.2 MG/DL (ref 8.3–10.6)
CHLORIDE SERPL-SCNC: 103 MMOL/L (ref 99–110)
CO2 SERPL-SCNC: 26 MMOL/L (ref 21–32)
CREAT SERPL-MCNC: 0.9 MG/DL (ref 0.6–1.2)
ERYTHROCYTE [DISTWIDTH] IN BLOOD BY AUTOMATED COUNT: 13.3 % (ref 11.7–14.9)
GFR, ESTIMATED: 58 ML/MIN/1.73M2
GLUCOSE SERPL-MCNC: 83 MG/DL (ref 74–99)
HCT VFR BLD AUTO: 35.6 % (ref 37–47)
HGB BLD-MCNC: 11.5 G/DL (ref 12.5–16)
MCH RBC QN AUTO: 31.9 PG (ref 27–31)
MCHC RBC AUTO-ENTMCNC: 32.3 G/DL (ref 32–36)
MCV RBC AUTO: 98.9 FL (ref 78–100)
PLATELET # BLD AUTO: 184 K/UL (ref 140–440)
PMV BLD AUTO: 9.6 FL (ref 7.5–11.1)
POTASSIUM SERPL-SCNC: 3.9 MMOL/L (ref 3.5–5.1)
RBC # BLD AUTO: 3.6 M/UL (ref 4.2–5.4)
SODIUM SERPL-SCNC: 140 MMOL/L (ref 136–145)
WBC OTHER # BLD: 4 K/UL (ref 4–10.5)

## 2025-01-23 PROCEDURE — 85027 COMPLETE CBC AUTOMATED: CPT

## 2025-01-23 PROCEDURE — 80048 BASIC METABOLIC PNL TOTAL CA: CPT

## 2025-01-23 PROCEDURE — 71046 X-RAY EXAM CHEST 2 VIEWS: CPT

## 2025-01-23 PROCEDURE — 86850 RBC ANTIBODY SCREEN: CPT

## 2025-01-23 PROCEDURE — 86901 BLOOD TYPING SEROLOGIC RH(D): CPT

## 2025-01-23 PROCEDURE — 36415 COLL VENOUS BLD VENIPUNCTURE: CPT

## 2025-01-23 PROCEDURE — 86900 BLOOD TYPING SEROLOGIC ABO: CPT

## 2025-01-23 RX ORDER — SODIUM CHLORIDE 9 MG/ML
INJECTION, SOLUTION INTRAVENOUS PRN
Status: CANCELLED | OUTPATIENT
Start: 2025-01-23

## 2025-01-23 RX ORDER — SODIUM CHLORIDE 0.9 % (FLUSH) 0.9 %
5-40 SYRINGE (ML) INJECTION EVERY 12 HOURS SCHEDULED
Status: CANCELLED | OUTPATIENT
Start: 2025-01-23

## 2025-01-23 RX ORDER — DROPERIDOL 2.5 MG/ML
0.62 INJECTION, SOLUTION INTRAMUSCULAR; INTRAVENOUS
Status: CANCELLED | OUTPATIENT
Start: 2025-01-23 | End: 2025-01-24

## 2025-01-23 RX ORDER — SODIUM CHLORIDE 0.9 % (FLUSH) 0.9 %
5-40 SYRINGE (ML) INJECTION PRN
Status: CANCELLED | OUTPATIENT
Start: 2025-01-23

## 2025-01-23 RX ORDER — HYDRALAZINE HYDROCHLORIDE 20 MG/ML
10 INJECTION INTRAMUSCULAR; INTRAVENOUS
Status: CANCELLED | OUTPATIENT
Start: 2025-01-23

## 2025-01-23 RX ORDER — LABETALOL HYDROCHLORIDE 5 MG/ML
10 INJECTION, SOLUTION INTRAVENOUS
Status: CANCELLED | OUTPATIENT
Start: 2025-01-23

## 2025-01-23 RX ORDER — OXYCODONE HYDROCHLORIDE 5 MG/1
5 TABLET ORAL
Status: CANCELLED | OUTPATIENT
Start: 2025-01-23 | End: 2025-01-24

## 2025-01-23 RX ORDER — NALOXONE HYDROCHLORIDE 0.4 MG/ML
INJECTION, SOLUTION INTRAMUSCULAR; INTRAVENOUS; SUBCUTANEOUS PRN
Status: CANCELLED | OUTPATIENT
Start: 2025-01-23

## 2025-01-23 RX ORDER — MEPERIDINE HYDROCHLORIDE 25 MG/ML
12.5 INJECTION INTRAMUSCULAR; INTRAVENOUS; SUBCUTANEOUS EVERY 5 MIN PRN
Status: CANCELLED | OUTPATIENT
Start: 2025-01-23

## 2025-01-23 RX ORDER — ONDANSETRON 2 MG/ML
4 INJECTION INTRAMUSCULAR; INTRAVENOUS
Status: CANCELLED | OUTPATIENT
Start: 2025-01-23 | End: 2025-01-24

## 2025-01-23 RX ORDER — FENTANYL CITRATE 50 UG/ML
50 INJECTION, SOLUTION INTRAMUSCULAR; INTRAVENOUS EVERY 5 MIN PRN
Status: CANCELLED | OUTPATIENT
Start: 2025-01-23

## 2025-01-23 RX ORDER — SCOPOLAMINE 1 MG/3D
1 PATCH, EXTENDED RELEASE TRANSDERMAL ONCE
Status: CANCELLED | OUTPATIENT
Start: 2025-01-23 | End: 2025-01-23

## 2025-01-24 ENCOUNTER — HOSPITAL ENCOUNTER (OUTPATIENT)
Age: 80
Setting detail: OUTPATIENT SURGERY
Discharge: HOME | End: 2025-01-24
Attending: INTERNAL MEDICINE | Admitting: INTERNAL MEDICINE
Payer: MEDICARE

## 2025-01-24 VITALS
TEMPERATURE: 96.6 F | SYSTOLIC BLOOD PRESSURE: 118 MMHG | HEIGHT: 66 IN | RESPIRATION RATE: 19 BRPM | HEART RATE: 59 BPM | BODY MASS INDEX: 22.66 KG/M2 | DIASTOLIC BLOOD PRESSURE: 50 MMHG | WEIGHT: 141 LBS | OXYGEN SATURATION: 97 %

## 2025-01-24 DIAGNOSIS — R07.9 CHEST PAIN: ICD-10-CM

## 2025-01-24 LAB — ECHO BSA: 1.73 M2

## 2025-01-24 PROCEDURE — 93458 L HRT ARTERY/VENTRICLE ANGIO: CPT | Performed by: INTERNAL MEDICINE

## 2025-01-24 PROCEDURE — 7100000010 HC PHASE II RECOVERY - FIRST 15 MIN: Performed by: INTERNAL MEDICINE

## 2025-01-24 PROCEDURE — 6360000002 HC RX W HCPCS: Performed by: INTERNAL MEDICINE

## 2025-01-24 PROCEDURE — 6360000004 HC RX CONTRAST MEDICATION: Performed by: INTERNAL MEDICINE

## 2025-01-24 PROCEDURE — 99152 MOD SED SAME PHYS/QHP 5/>YRS: CPT | Performed by: INTERNAL MEDICINE

## 2025-01-24 PROCEDURE — C1769 GUIDE WIRE: HCPCS | Performed by: INTERNAL MEDICINE

## 2025-01-24 PROCEDURE — C1725 CATH, TRANSLUMIN NON-LASER: HCPCS | Performed by: INTERNAL MEDICINE

## 2025-01-24 PROCEDURE — 2709999900 HC NON-CHARGEABLE SUPPLY: Performed by: INTERNAL MEDICINE

## 2025-01-24 PROCEDURE — 7100000011 HC PHASE II RECOVERY - ADDTL 15 MIN: Performed by: INTERNAL MEDICINE

## 2025-01-24 PROCEDURE — C1894 INTRO/SHEATH, NON-LASER: HCPCS | Performed by: INTERNAL MEDICINE

## 2025-01-24 PROCEDURE — 6370000000 HC RX 637 (ALT 250 FOR IP): Performed by: INTERNAL MEDICINE

## 2025-01-24 PROCEDURE — 2500000003 HC RX 250 WO HCPCS: Performed by: INTERNAL MEDICINE

## 2025-01-24 PROCEDURE — 2580000003 HC RX 258: Performed by: INTERNAL MEDICINE

## 2025-01-24 RX ORDER — 0.9 % SODIUM CHLORIDE 0.9 %
500 INTRAVENOUS SOLUTION INTRAVENOUS ONCE
Status: DISCONTINUED | OUTPATIENT
Start: 2025-01-24 | End: 2025-01-24 | Stop reason: HOSPADM

## 2025-01-24 RX ORDER — IOPAMIDOL 755 MG/ML
INJECTION, SOLUTION INTRAVASCULAR PRN
Status: DISCONTINUED | OUTPATIENT
Start: 2025-01-24 | End: 2025-01-24 | Stop reason: HOSPADM

## 2025-01-24 RX ORDER — SODIUM CHLORIDE 0.9 % (FLUSH) 0.9 %
5-40 SYRINGE (ML) INJECTION EVERY 12 HOURS SCHEDULED
Status: DISCONTINUED | OUTPATIENT
Start: 2025-01-24 | End: 2025-01-24 | Stop reason: HOSPADM

## 2025-01-24 RX ORDER — DIAZEPAM 5 MG/1
5 TABLET ORAL ONCE
Status: COMPLETED | OUTPATIENT
Start: 2025-01-24 | End: 2025-01-24

## 2025-01-24 RX ORDER — SODIUM CHLORIDE 9 MG/ML
INJECTION, SOLUTION INTRAVENOUS PRN
Status: DISCONTINUED | OUTPATIENT
Start: 2025-01-24 | End: 2025-01-24 | Stop reason: HOSPADM

## 2025-01-24 RX ORDER — SODIUM CHLORIDE 9 MG/ML
INJECTION, SOLUTION INTRAVENOUS CONTINUOUS
Status: DISCONTINUED | OUTPATIENT
Start: 2025-01-24 | End: 2025-01-24 | Stop reason: HOSPADM

## 2025-01-24 RX ORDER — SODIUM CHLORIDE 0.9 % (FLUSH) 0.9 %
5-40 SYRINGE (ML) INJECTION PRN
Status: DISCONTINUED | OUTPATIENT
Start: 2025-01-24 | End: 2025-01-24 | Stop reason: HOSPADM

## 2025-01-24 RX ORDER — DIPHENHYDRAMINE HCL 25 MG
25 TABLET ORAL ONCE
Status: COMPLETED | OUTPATIENT
Start: 2025-01-24 | End: 2025-01-24

## 2025-01-24 RX ORDER — ACETAMINOPHEN 325 MG/1
650 TABLET ORAL EVERY 4 HOURS PRN
Status: DISCONTINUED | OUTPATIENT
Start: 2025-01-24 | End: 2025-01-24 | Stop reason: HOSPADM

## 2025-01-24 RX ORDER — MIDAZOLAM HYDROCHLORIDE 1 MG/ML
INJECTION, SOLUTION INTRAMUSCULAR; INTRAVENOUS PRN
Status: DISCONTINUED | OUTPATIENT
Start: 2025-01-24 | End: 2025-01-24 | Stop reason: HOSPADM

## 2025-01-24 RX ADMIN — SODIUM CHLORIDE: 9 INJECTION, SOLUTION INTRAVENOUS at 07:29

## 2025-01-24 RX ADMIN — DIPHENHYDRAMINE HYDROCHLORIDE 25 MG: 25 TABLET ORAL at 07:29

## 2025-01-24 RX ADMIN — DIAZEPAM 5 MG: 5 TABLET ORAL at 07:29

## 2025-01-24 NOTE — PLAN OF CARE
All discharge instructions explained to the patient by Jennifer VICTOR at this time. No bleeding or hematoma noted along site. Patient walked to the bathroom without difficulty and IV removed per discharge orders. Patient denies any additional needs and all vitals stable for discharge home.

## 2025-01-24 NOTE — H&P
Naila Salazar, 79 y.o., female    Primary care physician:  Mitzy Marcos APRN     Chief Complaint: Chest Pain    History of Present Illness:  She was seen by Dr. Rhianna ovalles for chest pain she is here for left cardiac cath due to ongoing chest pain    Past medical history:    has a past medical history of Glaucoma and Hypertension.  Past surgical history:   has no past surgical history on file.  Social History:   reports that she has never smoked. She has never used smokeless tobacco. She reports that she does not use drugs.  Family history:  family history is not on file.    Allergies:      Allergies   Allergen Reactions    Nasonex [Mometasone Furoate] Swelling       Home Medications:  Prior to Admission medications    Medication Sig Start Date End Date Taking? Authorizing Provider   apixaban (ELIQUIS) 5 MG TABS tablet Take 1 tablet by mouth 2 times daily 10/14/24  Yes Munir Callejas APRN - CNP   furosemide (LASIX) 20 MG tablet Take 1 tablet by mouth 2 times daily 8/26/24  Yes Munir Callejas APRN - CNP   metoprolol tartrate (LOPRESSOR) 50 MG tablet Take 1 tablet by mouth 2 times daily Hold if SBP < 95 or if HR < 55 6/14/24  Yes Mery Rivera MD   bimatoprost (LUMIGAN) 0.01 % SOLN ophthalmic drops Place 1 drop into both eyes nightly 12/14/23 1/24/25 Yes Provider, MD Desiree   amiodarone (PACERONE) 100 MG tablet Take 1 tablet by mouth daily  Patient not taking: Reported on 1/24/2025 8/1/24 9/30/24  Gisell Ty MD   acetaminophen (TYLENOL) 500 MG tablet Take 2 tablets by mouth every 6 hours as needed for Pain or Fever 6/21/24 8/1/24  Darrian Grant MD       Review of systems: [unfilled]    Physical Examination:    BP (!) 162/77   Pulse 59   Temp (!) 96.6 °F (35.9 °C) (Temporal)   Resp 19   Ht 1.676 m (5' 6\")   Wt 64 kg (141 lb)   SpO2 99%   BMI 22.76 kg/m²      General Appearance:  No distress, conversant  Constitutional:  Well developed, Well nourished, No acute distress, Non-toxic

## 2025-01-24 NOTE — FLOWSHEET NOTE
01/24/25 0934   [REMOVED] Arterial Sheath 01/24/25 Right   Removal Date/Time: 01/24/25 0846  Placement Date/Time: 01/24/25 0815   Sheath Size: 6 Fr  Orientation: Right  Inserted by: Dr Lanier  Removal Reason: Per protocol   Site Assessment Clean, dry & intact;No bleeding;No Hematoma   Dressing Occlusive   Dressing Status New dressing applied     TR Band removed at this time. Right radial site free of bleeding/hematoma. Tegaderm applied to site. Arm board remains secured with kerlex as reminder to pt to minimize use of right arm.

## 2025-01-27 ENCOUNTER — TELEPHONE (OUTPATIENT)
Dept: CARDIOLOGY CLINIC | Age: 80
End: 2025-01-27

## 2025-01-27 NOTE — TELEPHONE ENCOUNTER
Spoke w/pt after recent procedure. Pt states she is doing well, her arm was slightly swollen Friday but is better now. Pt denies any issues at this time. Reminded pt of upcoming appt on 2/11/25. Pt confirmed appt.

## 2025-02-11 ENCOUNTER — OFFICE VISIT (OUTPATIENT)
Dept: CARDIOLOGY CLINIC | Age: 80
End: 2025-02-11
Payer: MEDICARE

## 2025-02-11 VITALS
WEIGHT: 144 LBS | SYSTOLIC BLOOD PRESSURE: 112 MMHG | BODY MASS INDEX: 23.14 KG/M2 | HEART RATE: 60 BPM | HEIGHT: 66 IN | OXYGEN SATURATION: 98 % | DIASTOLIC BLOOD PRESSURE: 62 MMHG

## 2025-02-11 DIAGNOSIS — I50.33 ACUTE ON CHRONIC DIASTOLIC CONGESTIVE HEART FAILURE (HCC): ICD-10-CM

## 2025-02-11 DIAGNOSIS — I48.91 NEW ONSET ATRIAL FIBRILLATION (HCC): ICD-10-CM

## 2025-02-11 DIAGNOSIS — I48.91 ATRIAL FIBRILLATION WITH RAPID VENTRICULAR RESPONSE (HCC): ICD-10-CM

## 2025-02-11 DIAGNOSIS — R07.2 PRECORDIAL PAIN: Primary | ICD-10-CM

## 2025-02-11 DIAGNOSIS — I20.9 ANGINA PECTORIS (HCC): ICD-10-CM

## 2025-02-11 DIAGNOSIS — I48.0 PAROXYSMAL ATRIAL FIBRILLATION (HCC): ICD-10-CM

## 2025-02-11 PROCEDURE — G8427 DOCREV CUR MEDS BY ELIG CLIN: HCPCS | Performed by: INTERNAL MEDICINE

## 2025-02-11 PROCEDURE — 99214 OFFICE O/P EST MOD 30 MIN: CPT | Performed by: INTERNAL MEDICINE

## 2025-02-11 PROCEDURE — G8399 PT W/DXA RESULTS DOCUMENT: HCPCS | Performed by: INTERNAL MEDICINE

## 2025-02-11 PROCEDURE — 1090F PRES/ABSN URINE INCON ASSESS: CPT | Performed by: INTERNAL MEDICINE

## 2025-02-11 PROCEDURE — G8420 CALC BMI NORM PARAMETERS: HCPCS | Performed by: INTERNAL MEDICINE

## 2025-02-11 PROCEDURE — 1036F TOBACCO NON-USER: CPT | Performed by: INTERNAL MEDICINE

## 2025-02-11 PROCEDURE — 1124F ACP DISCUSS-NO DSCNMKR DOCD: CPT | Performed by: INTERNAL MEDICINE

## 2025-02-11 PROCEDURE — 1159F MED LIST DOCD IN RCRD: CPT | Performed by: INTERNAL MEDICINE

## 2025-02-11 RX ORDER — FUROSEMIDE 20 MG/1
20 TABLET ORAL 2 TIMES DAILY
Qty: 180 TABLET | Refills: 5 | Status: SHIPPED | OUTPATIENT
Start: 2025-02-11

## 2025-02-11 RX ORDER — METOPROLOL TARTRATE 50 MG
50 TABLET ORAL 2 TIMES DAILY
Qty: 180 TABLET | Refills: 3 | Status: SHIPPED | OUTPATIENT
Start: 2025-02-11

## 2025-02-11 NOTE — ASSESSMENT & PLAN NOTE
Currently in sinus rhythm s/p cardioversion 2024 continue Eliquis 5 mg twice daily  She stopped amiodarone, continue metoprolol 50 mg twice daily

## 2025-02-11 NOTE — ASSESSMENT & PLAN NOTE
Concerns for chest pain cardiac cath revealed no significant obstructive coronary artery disease continue medical management

## 2025-02-11 NOTE — PROGRESS NOTES
CARDIOLOGY  NOTE    Chief Complaint: Atrial fibrillation chest pain    HPI:   Naila is a 79 y.o. year old who has Past medical history as noted below.  Naila comes in for evaluation due to concerns for A-fib she is seeing Dr. Ty  She is s/p cardioversion and was initially on amiodarone since July 2020 for which she had stopped on her own as it was making her sick.  Before cardioversion ALFREDITO was attempted but it could not be advanced and she was anticoagulated for several weeks and then cardioverted.  She underwent stress test due to ongoing chest pain in December 2024 which was abnormal hence underwent cardiac cath in January 2025 revealing no significant obstructive coronary artery disease she continues to complain of chest pain and arm pain including right hand numbness      Current Outpatient Medications   Medication Sig Dispense Refill    furosemide (LASIX) 20 MG tablet Take 1 tablet by mouth 2 times daily 180 tablet 5    apixaban (ELIQUIS) 5 MG TABS tablet Take 1 tablet by mouth 2 times daily 180 tablet 5    metoprolol tartrate (LOPRESSOR) 50 MG tablet Take 1 tablet by mouth 2 times daily Hold if SBP < 95 or if HR < 55 180 tablet 3    acetaminophen (TYLENOL) 500 MG tablet Take 2 tablets by mouth every 6 hours as needed for Pain or Fever 60 tablet 0    bimatoprost (LUMIGAN) 0.01 % SOLN ophthalmic drops Place 1 drop into both eyes nightly       No current facility-administered medications for this visit.       Allergies:   Nasonex [mometasone furoate]    Patient History:  Past Medical History:   Diagnosis Date    Glaucoma     History of left heart catheterization (LHC) 01/24/2025    Hypertension      Past Surgical History:   Procedure Laterality Date    CARDIAC PROCEDURE N/A 1/24/2025    Left heart cath / coronary angiography performed by Cindy Howard MD at [facility] CARDIAC CATH LAB     No family history on file.  Social History     Tobacco Use    Smoking status:

## 2025-02-26 ENCOUNTER — TELEPHONE (OUTPATIENT)
Dept: CARDIOLOGY CLINIC | Age: 80
End: 2025-02-26

## 2025-02-26 NOTE — TELEPHONE ENCOUNTER
Left msg for pt to return call to schedule in June with Dr Ty   Quinolones Counseling:  I discussed with the patient the risks of fluoroquinolones including but not limited to GI upset, allergic reaction, drug rash, diarrhea, dizziness, photosensitivity, yeast infections, liver function test abnormalities, tendonitis/tendon rupture.

## 2025-06-19 ENCOUNTER — OFFICE VISIT (OUTPATIENT)
Dept: CARDIOLOGY CLINIC | Age: 80
End: 2025-06-19
Payer: MEDICARE

## 2025-06-19 VITALS
HEART RATE: 54 BPM | BODY MASS INDEX: 23.89 KG/M2 | SYSTOLIC BLOOD PRESSURE: 98 MMHG | HEIGHT: 65 IN | DIASTOLIC BLOOD PRESSURE: 60 MMHG | WEIGHT: 143.4 LBS

## 2025-06-19 DIAGNOSIS — I48.0 PAROXYSMAL ATRIAL FIBRILLATION (HCC): Primary | ICD-10-CM

## 2025-06-19 DIAGNOSIS — I50.32 CHRONIC HEART FAILURE WITH PRESERVED EJECTION FRACTION (HFPEF) (HCC): ICD-10-CM

## 2025-06-19 DIAGNOSIS — R00.1 SINUS BRADYCARDIA: ICD-10-CM

## 2025-06-19 PROCEDURE — 1036F TOBACCO NON-USER: CPT | Performed by: INTERNAL MEDICINE

## 2025-06-19 PROCEDURE — G8399 PT W/DXA RESULTS DOCUMENT: HCPCS | Performed by: INTERNAL MEDICINE

## 2025-06-19 PROCEDURE — G8420 CALC BMI NORM PARAMETERS: HCPCS | Performed by: INTERNAL MEDICINE

## 2025-06-19 PROCEDURE — 1159F MED LIST DOCD IN RCRD: CPT | Performed by: INTERNAL MEDICINE

## 2025-06-19 PROCEDURE — 1124F ACP DISCUSS-NO DSCNMKR DOCD: CPT | Performed by: INTERNAL MEDICINE

## 2025-06-19 PROCEDURE — 99214 OFFICE O/P EST MOD 30 MIN: CPT | Performed by: INTERNAL MEDICINE

## 2025-06-19 PROCEDURE — 1090F PRES/ABSN URINE INCON ASSESS: CPT | Performed by: INTERNAL MEDICINE

## 2025-06-19 PROCEDURE — G8427 DOCREV CUR MEDS BY ELIG CLIN: HCPCS | Performed by: INTERNAL MEDICINE

## 2025-06-19 RX ORDER — METOPROLOL TARTRATE 50 MG
25 TABLET ORAL 2 TIMES DAILY
Qty: 180 TABLET | Refills: 3 | Status: SHIPPED | OUTPATIENT
Start: 2025-06-19

## 2025-06-19 NOTE — PROGRESS NOTES
CLINICAL STAFF DOCUMENTATION    Gisell Yoo FRANCISCO Martin  1945  [id number]    Have you had any Chest Pain recently? - Yes has gotten better, not as frequent, not as intense  If Yes DO EKG   When did the pain begin? - Years   Have you had any Shortness of Breath - Yes  When did it begin? - Years   If Yes - When mostly all of the time  Have you had any dizziness - No  Have you had any palpitations recently? - Yes pt states been going on for years has also improved not as frequent as before  Do you have any edema - swelling in legs    If Yes - CHECK TO SEE IF THE EDEMA IS PITTING  How long have they been having edema - Years  If Yes - Have they worn compression stockings Yes    When did you have your last labs drawn 1/23/2025  What doctor ordered herbie  Do we have the labs in their chart Yes    Do you need any prescriptions refilled? - No    Do you have a surgery or procedure scheduled in the near future - No    Do use tobacco products? - No  Do you drink alcohol? - No  Do you use any illicit drugs? - No  Caffeine? - Yes occ tea, coffee    Check medication list thoroughly!!! AND RECONCILE OUTSIDE MEDICATIONS  If dose has changed change the entire order not just the MG  BE SURE TO ASK PATIENT IF THEY NEED MEDICATION REFILLS  Verify Pharmacy and update if incorrect    Add to every patient's \"wrap up\" the following dot phrase AFTERVISITCARDIOHEARTHOUSE and ensure we explain this to our patients

## 2025-06-19 NOTE — PROGRESS NOTES
Cardiology follow-up note    Primary care physician: Mitzy Marcos APRN      History of present illness: Naila is a 79 y.o.year old female  who has prior history of hypertension who is here for follow-up.  In June 2024 she was admitted to the hospital because of atrial fibrillation with rapid ventricular rate.  ALFERDITO was attempted but because of difficult probe placement was abandoned.  Patient was subsequent started on Eliquis.  Subsequently she was started on amiodarone and cardioversion was performed in July 2024.  In January 2025 she had a heart catheterization because of ongoing intermittent chest pain.  No significant coronary artery disease was noted.  Today she states that overall she is doing well.  She does feel fatigued and thinks that this is due to her slow pulse rate.  She has not passed out.  She is currently taking metoprolol 50 mg twice a day.    Past medical history:    has a past medical history of Glaucoma, History of left heart catheterization (LHC), and Hypertension.    Past surgical history:   has a past surgical history that includes Cardiac procedure (N/A, 1/24/2025).    Social History:   reports that she has never smoked. She has never used smokeless tobacco. She reports that she does not drink alcohol and does not use drugs.    Family history:  No family history of premature CAD  Allergies   Allergen Reactions    Nasonex [Mometasone Furoate] Swelling       No current facility-administered medications for this visit.    Current Outpatient Medications   Medication Sig Dispense Refill    furosemide (LASIX) 20 MG tablet Take 1 tablet by mouth 2 times daily 180 tablet 5    apixaban (ELIQUIS) 5 MG TABS tablet Take 1 tablet by mouth 2 times daily 180 tablet 5    metoprolol tartrate (LOPRESSOR) 50 MG tablet Take 1 tablet by mouth 2 times daily Hold if SBP < 95 or if HR < 55 180 tablet 3    acetaminophen (TYLENOL) 500 MG tablet Take 2 tablets by mouth every 6 hours as needed for Pain or

## (undated) DEVICE — RADIFOCUS GLIDEWIRE: Brand: GLIDEWIRE

## (undated) DEVICE — Device

## (undated) DEVICE — RADIFOCUS GLIDEWIRE ADVANTAGE GUIDEWIRE: Brand: GLIDEWIRE ADVANTAGE

## (undated) DEVICE — RADIFOCUS OPTITORQUE ANGIOGRAPHIC CATHETER: Brand: OPTITORQUE

## (undated) DEVICE — GLIDESHEATH SLENDER ACCESS KIT: Brand: GLIDESHEATH SLENDER

## (undated) DEVICE — ANGIOGRAPHY KIT CUST MANIFOLD